# Patient Record
Sex: MALE | Race: WHITE | NOT HISPANIC OR LATINO | Employment: FULL TIME | URBAN - METROPOLITAN AREA
[De-identification: names, ages, dates, MRNs, and addresses within clinical notes are randomized per-mention and may not be internally consistent; named-entity substitution may affect disease eponyms.]

---

## 2020-08-06 ENCOUNTER — APPOINTMENT (OUTPATIENT)
Dept: LAB | Facility: CLINIC | Age: 40
End: 2020-08-06
Payer: COMMERCIAL

## 2020-08-06 ENCOUNTER — TRANSCRIBE ORDERS (OUTPATIENT)
Dept: URGENT CARE | Facility: CLINIC | Age: 40
End: 2020-08-06

## 2020-08-06 DIAGNOSIS — F43.10 PTSD (POST-TRAUMATIC STRESS DISORDER): ICD-10-CM

## 2020-08-06 DIAGNOSIS — F41.9 ANXIETY: Primary | ICD-10-CM

## 2020-08-06 PROCEDURE — 80307 DRUG TEST PRSMV CHEM ANLYZR: CPT | Performed by: FAMILY MEDICINE

## 2020-08-07 LAB
AMPHETAMINES UR QL SCN: NEGATIVE NG/ML
BARBITURATES UR QL SCN: NEGATIVE NG/ML
BENZODIAZ UR QL: NEGATIVE NG/ML
BZE UR QL: NEGATIVE NG/ML
CANNABINOIDS UR QL SCN: NEGATIVE NG/ML
METHADONE UR QL SCN: NEGATIVE NG/ML
OPIATES UR QL: NEGATIVE NG/ML
PCP UR QL: NEGATIVE NG/ML
PROPOXYPH UR QL SCN: NEGATIVE NG/ML

## 2020-10-31 ENCOUNTER — TRANSCRIBE ORDERS (OUTPATIENT)
Dept: LAB | Facility: HOSPITAL | Age: 40
End: 2020-10-31

## 2020-10-31 ENCOUNTER — LAB (OUTPATIENT)
Dept: LAB | Facility: HOSPITAL | Age: 40
End: 2020-10-31
Payer: COMMERCIAL

## 2020-10-31 DIAGNOSIS — D64.9 ANEMIA, UNSPECIFIED TYPE: ICD-10-CM

## 2020-10-31 DIAGNOSIS — D64.9 ANEMIA, UNSPECIFIED TYPE: Primary | ICD-10-CM

## 2020-10-31 LAB
ANION GAP SERPL CALCULATED.3IONS-SCNC: 7 MMOL/L (ref 4–13)
BASOPHILS # BLD AUTO: 0.1 THOUSANDS/ΜL (ref 0–0.1)
BASOPHILS NFR BLD AUTO: 1 % (ref 0–2)
BUN SERPL-MCNC: 16 MG/DL (ref 7–25)
CALCIUM SERPL-MCNC: 9 MG/DL (ref 8.6–10.5)
CHLORIDE SERPL-SCNC: 105 MMOL/L (ref 98–107)
CHOLEST SERPL-MCNC: 158 MG/DL (ref 0–200)
CO2 SERPL-SCNC: 26 MMOL/L (ref 21–31)
CREAT SERPL-MCNC: 0.95 MG/DL (ref 0.7–1.3)
EOSINOPHIL # BLD AUTO: 0.3 THOUSAND/ΜL (ref 0–0.61)
EOSINOPHIL NFR BLD AUTO: 4 % (ref 0–5)
ERYTHROCYTE [DISTWIDTH] IN BLOOD BY AUTOMATED COUNT: 13.6 % (ref 11.5–14.5)
GFR SERPL CREATININE-BSD FRML MDRD: 99 ML/MIN/1.73SQ M
GLUCOSE P FAST SERPL-MCNC: 93 MG/DL (ref 65–99)
HCT VFR BLD AUTO: 46.3 % (ref 42–47)
HDLC SERPL-MCNC: 44 MG/DL
HGB BLD-MCNC: 16.2 G/DL (ref 14–18)
IRON SATN MFR SERPL: 33 %
IRON SERPL-MCNC: 90 UG/DL (ref 65–175)
LDLC SERPL CALC-MCNC: 99 MG/DL (ref 0–100)
LYMPHOCYTES # BLD AUTO: 1.5 THOUSANDS/ΜL (ref 0.6–4.47)
LYMPHOCYTES NFR BLD AUTO: 23 % (ref 21–51)
MCH RBC QN AUTO: 33.6 PG (ref 26–34)
MCHC RBC AUTO-ENTMCNC: 35 G/DL (ref 31–37)
MCV RBC AUTO: 96 FL (ref 81–99)
MONOCYTES # BLD AUTO: 0.6 THOUSAND/ΜL (ref 0.17–1.22)
MONOCYTES NFR BLD AUTO: 9 % (ref 2–12)
NEUTROPHILS # BLD AUTO: 4.1 THOUSANDS/ΜL (ref 1.4–6.5)
NEUTS SEG NFR BLD AUTO: 63 % (ref 42–75)
NONHDLC SERPL-MCNC: 114 MG/DL
PLATELET # BLD AUTO: 136 THOUSANDS/UL (ref 149–390)
PMV BLD AUTO: 10 FL (ref 8.6–11.7)
POTASSIUM SERPL-SCNC: 4.1 MMOL/L (ref 3.5–5.5)
RBC # BLD AUTO: 4.82 MILLION/UL (ref 4.3–5.9)
SODIUM SERPL-SCNC: 138 MMOL/L (ref 134–143)
TIBC SERPL-MCNC: 269 UG/DL (ref 250–450)
TRIGL SERPL-MCNC: 74 MG/DL (ref 44–166)
TSH SERPL DL<=0.05 MIU/L-ACNC: 0.93 UIU/ML (ref 0.45–5.33)
WBC # BLD AUTO: 6.5 THOUSAND/UL (ref 4.8–10.8)

## 2020-10-31 PROCEDURE — 80061 LIPID PANEL: CPT

## 2020-10-31 PROCEDURE — 84443 ASSAY THYROID STIM HORMONE: CPT

## 2020-10-31 PROCEDURE — 85025 COMPLETE CBC W/AUTO DIFF WBC: CPT

## 2020-10-31 PROCEDURE — 36415 COLL VENOUS BLD VENIPUNCTURE: CPT

## 2020-10-31 PROCEDURE — 83540 ASSAY OF IRON: CPT

## 2020-10-31 PROCEDURE — 83550 IRON BINDING TEST: CPT

## 2020-10-31 PROCEDURE — 80048 BASIC METABOLIC PNL TOTAL CA: CPT

## 2020-12-21 ENCOUNTER — HOSPITAL ENCOUNTER (EMERGENCY)
Facility: HOSPITAL | Age: 40
Discharge: HOME | End: 2020-12-21
Attending: EMERGENCY MEDICINE
Payer: COMMERCIAL

## 2020-12-21 ENCOUNTER — APPOINTMENT (EMERGENCY)
Dept: RADIOLOGY | Facility: HOSPITAL | Age: 40
End: 2020-12-21
Payer: COMMERCIAL

## 2020-12-21 VITALS
WEIGHT: 150 LBS | DIASTOLIC BLOOD PRESSURE: 69 MMHG | SYSTOLIC BLOOD PRESSURE: 104 MMHG | OXYGEN SATURATION: 96 % | BODY MASS INDEX: 21.47 KG/M2 | TEMPERATURE: 98.5 F | HEIGHT: 70 IN | RESPIRATION RATE: 20 BRPM | HEART RATE: 72 BPM

## 2020-12-21 DIAGNOSIS — A05.9 FOOD POISONING: Primary | ICD-10-CM

## 2020-12-21 LAB
ALBUMIN SERPL BCP-MCNC: 4.4 G/DL (ref 3.5–5.7)
ALP SERPL-CCNC: 72 U/L (ref 40–150)
ALT SERPL W P-5'-P-CCNC: 46 U/L (ref 7–52)
ANION GAP SERPL CALCULATED.3IONS-SCNC: 7 MMOL/L (ref 4–13)
AST SERPL W P-5'-P-CCNC: 80 U/L (ref 13–39)
BASOPHILS # BLD AUTO: 0 THOUSANDS/ΜL (ref 0–0.1)
BASOPHILS NFR BLD AUTO: 1 % (ref 0–2)
BILIRUB SERPL-MCNC: 1.2 MG/DL (ref 0.2–1)
BILIRUB UR QL STRIP: ABNORMAL
BUN SERPL-MCNC: 9 MG/DL (ref 7–25)
CALCIUM SERPL-MCNC: 9 MG/DL (ref 8.6–10.5)
CHLORIDE SERPL-SCNC: 102 MMOL/L (ref 98–107)
CLARITY UR: CLEAR
CO2 SERPL-SCNC: 27 MMOL/L (ref 21–31)
COLOR UR: ABNORMAL
CREAT SERPL-MCNC: 0.75 MG/DL (ref 0.7–1.3)
EOSINOPHIL # BLD AUTO: 0.1 THOUSAND/ΜL (ref 0–0.61)
EOSINOPHIL NFR BLD AUTO: 1 % (ref 0–5)
ERYTHROCYTE [DISTWIDTH] IN BLOOD BY AUTOMATED COUNT: 13 % (ref 11.5–14.5)
FLUAV RNA RESP QL NAA+PROBE: NEGATIVE
FLUBV RNA RESP QL NAA+PROBE: NEGATIVE
GFR SERPL CREATININE-BSD FRML MDRD: 114 ML/MIN/1.73SQ M
GLUCOSE SERPL-MCNC: 106 MG/DL (ref 65–99)
GLUCOSE UR STRIP-MCNC: NEGATIVE MG/DL
HCT VFR BLD AUTO: 43.4 % (ref 42–47)
HGB BLD-MCNC: 14.8 G/DL (ref 14–18)
HGB UR QL STRIP.AUTO: NEGATIVE
KETONES UR STRIP-MCNC: NEGATIVE MG/DL
LEUKOCYTE ESTERASE UR QL STRIP: NEGATIVE
LIPASE SERPL-CCNC: 21 U/L (ref 11–82)
LYMPHOCYTES # BLD AUTO: 1.4 THOUSANDS/ΜL (ref 0.6–4.47)
LYMPHOCYTES NFR BLD AUTO: 25 % (ref 21–51)
MCH RBC QN AUTO: 32.7 PG (ref 26–34)
MCHC RBC AUTO-ENTMCNC: 34.1 G/DL (ref 31–37)
MCV RBC AUTO: 96 FL (ref 81–99)
MONOCYTES # BLD AUTO: 0.4 THOUSAND/ΜL (ref 0.17–1.22)
MONOCYTES NFR BLD AUTO: 7 % (ref 2–12)
NEUTROPHILS # BLD AUTO: 3.7 THOUSANDS/ΜL (ref 1.4–6.5)
NEUTS SEG NFR BLD AUTO: 66 % (ref 42–75)
NITRITE UR QL STRIP: NEGATIVE
PH UR STRIP.AUTO: 6.5 [PH]
PLATELET # BLD AUTO: 144 THOUSANDS/UL (ref 149–390)
PMV BLD AUTO: 9.3 FL (ref 8.6–11.7)
POTASSIUM SERPL-SCNC: 3.3 MMOL/L (ref 3.5–5.5)
PROT SERPL-MCNC: 6.7 G/DL (ref 6.4–8.9)
PROT UR STRIP-MCNC: NEGATIVE MG/DL
RBC # BLD AUTO: 4.53 MILLION/UL (ref 4.3–5.9)
RSV RNA RESP QL NAA+PROBE: NEGATIVE
SARS-COV-2 RNA RESP QL NAA+PROBE: NEGATIVE
SODIUM SERPL-SCNC: 136 MMOL/L (ref 134–143)
SP GR UR STRIP.AUTO: 1.02 (ref 1–1.03)
TROPONIN I SERPL-MCNC: <0.03 NG/ML
UROBILINOGEN UR QL STRIP.AUTO: 1 E.U./DL
WBC # BLD AUTO: 5.6 THOUSAND/UL (ref 4.8–10.8)

## 2020-12-21 PROCEDURE — 83690 ASSAY OF LIPASE: CPT | Performed by: EMERGENCY MEDICINE

## 2020-12-21 PROCEDURE — 36415 COLL VENOUS BLD VENIPUNCTURE: CPT | Performed by: EMERGENCY MEDICINE

## 2020-12-21 PROCEDURE — 93005 ELECTROCARDIOGRAM TRACING: CPT

## 2020-12-21 PROCEDURE — 0241U HB NFCT DS VIR RESP RNA 4 TRGT: CPT | Performed by: EMERGENCY MEDICINE

## 2020-12-21 PROCEDURE — 81003 URINALYSIS AUTO W/O SCOPE: CPT | Performed by: EMERGENCY MEDICINE

## 2020-12-21 PROCEDURE — 85025 COMPLETE CBC W/AUTO DIFF WBC: CPT | Performed by: EMERGENCY MEDICINE

## 2020-12-21 PROCEDURE — 80053 COMPREHEN METABOLIC PANEL: CPT | Performed by: EMERGENCY MEDICINE

## 2020-12-21 PROCEDURE — 84484 ASSAY OF TROPONIN QUANT: CPT | Performed by: EMERGENCY MEDICINE

## 2020-12-21 PROCEDURE — 71045 X-RAY EXAM CHEST 1 VIEW: CPT

## 2020-12-21 PROCEDURE — 99285 EMERGENCY DEPT VISIT HI MDM: CPT

## 2020-12-21 PROCEDURE — 99285 EMERGENCY DEPT VISIT HI MDM: CPT | Performed by: EMERGENCY MEDICINE

## 2020-12-21 RX ORDER — ASPIRIN 81 MG/1
162 TABLET, CHEWABLE ORAL ONCE
Status: COMPLETED | OUTPATIENT
Start: 2020-12-21 | End: 2020-12-21

## 2020-12-21 RX ORDER — ONDANSETRON 4 MG/1
4 TABLET, ORALLY DISINTEGRATING ORAL EVERY 6 HOURS PRN
Qty: 10 TABLET | Refills: 0 | Status: ON HOLD | OUTPATIENT
Start: 2020-12-21 | End: 2021-08-04 | Stop reason: ALTCHOICE

## 2020-12-21 RX ORDER — MIRTAZAPINE 15 MG/1
7.5 TABLET, FILM COATED ORAL
Status: ON HOLD | COMMUNITY
End: 2021-09-28 | Stop reason: SDUPTHER

## 2020-12-21 RX ADMIN — ASPIRIN 81 MG CHEWABLE TABLET 162 MG: 81 TABLET CHEWABLE at 20:31

## 2020-12-22 LAB
ATRIAL RATE: 84 BPM
P AXIS: 34 DEGREES
PR INTERVAL: 140 MS
QRS AXIS: 76 DEGREES
QRSD INTERVAL: 82 MS
QT INTERVAL: 354 MS
QTC INTERVAL: 418 MS
T WAVE AXIS: 70 DEGREES
VENTRICULAR RATE: 84 BPM

## 2020-12-22 PROCEDURE — 93010 ELECTROCARDIOGRAM REPORT: CPT | Performed by: INTERNAL MEDICINE

## 2020-12-23 ENCOUNTER — NURSE TRIAGE (OUTPATIENT)
Dept: OTHER | Facility: OTHER | Age: 40
End: 2020-12-23

## 2020-12-23 DIAGNOSIS — Z20.822 EXPOSURE TO COVID-19 VIRUS: Primary | ICD-10-CM

## 2020-12-23 DIAGNOSIS — Z20.822 EXPOSURE TO COVID-19 VIRUS: ICD-10-CM

## 2020-12-23 PROCEDURE — U0003 INFECTIOUS AGENT DETECTION BY NUCLEIC ACID (DNA OR RNA); SEVERE ACUTE RESPIRATORY SYNDROME CORONAVIRUS 2 (SARS-COV-2) (CORONAVIRUS DISEASE [COVID-19]), AMPLIFIED PROBE TECHNIQUE, MAKING USE OF HIGH THROUGHPUT TECHNOLOGIES AS DESCRIBED BY CMS-2020-01-R: HCPCS | Performed by: FAMILY MEDICINE

## 2020-12-24 LAB — SARS-COV-2 RNA SPEC QL NAA+PROBE: NOT DETECTED

## 2021-01-14 ENCOUNTER — OFFICE VISIT (OUTPATIENT)
Dept: URGENT CARE | Facility: CLINIC | Age: 41
End: 2021-01-14
Payer: COMMERCIAL

## 2021-01-14 ENCOUNTER — NURSE TRIAGE (OUTPATIENT)
Dept: OTHER | Facility: OTHER | Age: 41
End: 2021-01-14

## 2021-01-14 VITALS
HEIGHT: 70 IN | TEMPERATURE: 98 F | WEIGHT: 150 LBS | HEART RATE: 85 BPM | OXYGEN SATURATION: 97 % | BODY MASS INDEX: 21.47 KG/M2 | RESPIRATION RATE: 18 BRPM

## 2021-01-14 DIAGNOSIS — R05.9 COUGH: Primary | ICD-10-CM

## 2021-01-14 DIAGNOSIS — B34.9 VIRAL ILLNESS: ICD-10-CM

## 2021-01-14 PROCEDURE — G0382 LEV 3 HOSP TYPE B ED VISIT: HCPCS | Performed by: PHYSICIAN ASSISTANT

## 2021-01-14 PROCEDURE — U0005 INFEC AGEN DETEC AMPLI PROBE: HCPCS | Performed by: PHYSICIAN ASSISTANT

## 2021-01-14 PROCEDURE — 99203 OFFICE O/P NEW LOW 30 MIN: CPT | Performed by: PHYSICIAN ASSISTANT

## 2021-01-14 PROCEDURE — U0003 INFECTIOUS AGENT DETECTION BY NUCLEIC ACID (DNA OR RNA); SEVERE ACUTE RESPIRATORY SYNDROME CORONAVIRUS 2 (SARS-COV-2) (CORONAVIRUS DISEASE [COVID-19]), AMPLIFIED PROBE TECHNIQUE, MAKING USE OF HIGH THROUGHPUT TECHNOLOGIES AS DESCRIBED BY CMS-2020-01-R: HCPCS | Performed by: PHYSICIAN ASSISTANT

## 2021-01-14 PROCEDURE — 99283 EMERGENCY DEPT VISIT LOW MDM: CPT | Performed by: PHYSICIAN ASSISTANT

## 2021-01-14 RX ORDER — ZIPRASIDONE HYDROCHLORIDE 40 MG/1
CAPSULE ORAL
Status: ON HOLD | COMMUNITY
End: 2021-05-14 | Stop reason: ALTCHOICE

## 2021-01-14 NOTE — PATIENT INSTRUCTIONS
You can take 2000 IU of vitamin D3 daily, vitamin-C 1 g every 12 hours, and a daily multivitamin  Please self quarantine until results of testing are known and if positive please follow CDC guidelines for quarantining as discussed  COVID-19 (Coronavirus Disease 2019)   WHAT YOU NEED TO KNOW:   COVID-19 is the disease caused by the novel (new) coronavirus first discovered in December 2019  Coronaviruses generally cause upper respiratory (nose, throat, and lung) infections, such as a cold  The new virus can also cause serious lower respiratory conditions, such as pneumonia or acute respiratory distress syndrome (ARDS)  Anyone can develop serious problems from the new virus, but your risk is higher if you are 65 or older  A weak immune system, diabetes, or a heart or lung condition can also increase your risk  DISCHARGE INSTRUCTIONS:   If you think you or someone you know may be infected:  Do the following to protect others:  · If emergency care is needed,  tell the  about the possible infection, or call ahead and tell the emergency department  · Call a healthcare provider  for instructions if symptoms are mild  Anyone who may be infected should not  arrive without calling first  The provider will need to protect staff members and other patients  · The person who may be infected needs to wear a face covering  while getting medical care  This will help lower the risk of infecting others  Coverings are not used for anyone who is younger than 2 years, has breathing problems, or cannot remove it  The provider can give you instructions for anyone who cannot wear a covering  Call your local emergency number (911 in the [address] [place] [address],[address]) or go to the emergency department if:   · You have trouble breathing or shortness of breath at rest     · You have chest pain or pressure that lasts longer than 5 minutes  · You become confused or hard to wake  · Your lips or face are blue      · You have a fever of 104°F (40°C) or higher  Call your doctor if:   · You do not  have symptoms of COVID-19 but had close physical contact within 14 days with someone who tested positive  · You have questions or concerns about your condition or care  Medicines: You may need any of the following for mild symptoms:  · Decongestants  help reduce nasal congestion and help you breathe more easily  If you take decongestant pills, they may make you feel restless or cause problems with your sleep  Do not use decongestant sprays for more than a few days  · Cough suppressants  help reduce coughing  Ask your healthcare provider which type of cough medicine is best for you  · Acetaminophen  decreases pain and fever  It is available without a doctor's order  Ask how much to take and how often to take it  Follow directions  Read the labels of all other medicines you are using to see if they also contain acetaminophen, or ask your doctor or pharmacist  Acetaminophen can cause liver damage if not taken correctly  Do not use more than 4 grams (4,000 milligrams) total of acetaminophen in one day  · NSAIDs , such as ibuprofen, help decrease swelling, pain, and fever  NSAIDs can cause stomach bleeding or kidney problems in certain people  If you take blood thinner medicine, always ask your healthcare provider if NSAIDs are safe for you  Always read the medicine label and follow directions  · Take your medicine as directed  Contact your healthcare provider if you think your medicine is not helping or if you have side effects  Tell him or her if you are allergic to any medicine  Keep a list of the medicines, vitamins, and herbs you take  Include the amounts, and when and why you take them  Bring the list or the pill bottles to follow-up visits  Carry your medicine list with you in case of an emergency  How the 2019 coronavirus spreads: The virus spreads quickly and easily   You can become infected if you are in contact with a large amount of the virus, even for a short time  You can also become infected by being around a small amount of virus for a long time  The following are ways the virus is thought to spread, but more information may be coming:  · Droplets are the most common way all coronaviruses spread  The virus can travel in droplets that form when a person talks, coughs, or sneezes  Anyone who breathes in the droplets or gets them in his or her eyes can become infected with the virus  Close personal contact with an infected person is thought to be the main way the virus spreads  Close personal contact means you are within 6 feet (2 meters) of the person  · Person-to-person contact can spread the virus  For example, a person with the virus on his or her hands can spread it by shaking hands with someone  At this time, it does not appear that the virus can be passed to a baby during pregnancy or delivery  The baby can be infected after he or she is born through person-to-person contact  The virus also does not appear to spread in breast milk  If you are pregnant or breastfeeding, talk to your healthcare provider or obstetrician about any concerns you have  · The virus can stay on objects and surfaces  A person can get the virus on his or her hands by touching the object or surface  Infection happens if the person then touches his or her eyes or mouth with unwashed hands  It is not yet known how long the virus can stay on an object or surface  That is why it is important to clean all surfaces that are used regularly  · An infected animal may be able to infect a person who touches it  This may happen at live markets or on a farm  How everyone can lower the risk for COVID-19:  The best way to prevent infection is to avoid anyone who is infected, but this can be hard to do  An infected person can spread the virus before signs or symptoms begin, or even if signs or symptoms never develop   The following can help lower the risk for infection: · Wash your hands often throughout the day  Use soap and water  Rub your soapy hands together, lacing your fingers  Wash the front and back of each hand, and in between your fingers  Use the fingers of one hand to scrub under the fingernails of the other hand  Wash for at least 20 seconds  Rinse with warm, running water for several seconds  Then dry your hands with a clean towel or paper towel  Use hand  that contains alcohol if soap and water are not available  Do not touch your eyes, nose, or mouth without washing your hands first  Teach children how to wash their hands and use hand   · Cover a sneeze or cough  This prevents droplets from traveling from you to others  Turn your face away and cover your mouth and nose with a tissue  Throw the tissue away  Use the bend of your arm if a tissue is not available  Then wash your hands well with soap and water or use hand   Turn and cover your face if you are around someone who is sneezing or coughing  Teach children how to cover a cough or sneeze  · Follow worldwide, national, and local social distancing guidelines  Social distancing means people avoid close physical contact so the virus cannot spread from one person to another  Keep at least 6 feet (2 meters) between you and others  Also keep this distance from anyone who comes to your home, such as someone making a delivery  · Make a habit of not touching your face  It is not known how long the virus can stay on objects and surfaces  If you get the virus on your hands, you can transfer it to your eyes, nose, or mouth and become infected  You can also transfer it to objects, surfaces, or people  Be aware of what you touch when you go out  Examples include handrails and elevator buttons  Try not to touch anything with bare hands unless it is necessary  Wash your hands before you leave your home and when you return      · Clean and disinfect high-touch surfaces and objects often   Use a disinfecting solution or wipes  You can make a solution by diluting 4 teaspoons of bleach in 1 quart (4 cups) of water  Clean and disinfect even if you think no one living in or coming to your home is infected with the virus  You can wipe items with a disinfecting cloth before you bring them into your home  Wash your hands after you handle anything you bring into your home  · Make your immune system as healthy as possible  A weakened immune system makes you more vulnerable to the new coronavirus  No COVID-19 vaccine is available yet  Vaccines such as the flu and pneumonia vaccines can help your immune system  Your healthcare provider can tell you which vaccines to get, and when to get them  Keep your immune system as strong as possible  Do not smoke  Eat healthy foods, exercise regularly, and try to manage stress  Go to bed and wake up at the same times each day  Social distancing guidelines:  National and local social distancing rules vary  Rules may change over time as restrictions are lifted  Restrictions may return if an outbreak happens where you live  It is important to know and follow all current social distancing rules in your area  The following are general guidelines:  · Limit trips out of your home  You may be able to have food, medicines, and other supplies delivered  If possible, have delivered items left at your door or other area  Try not to have someone hand you an item  You will be so close to the person that the virus can spread between you  · Do not have close physical contact with anyone who does not live in your home  Do not shake hands with, hug, or kiss a person as a greeting  Stand or walk as far from others as possible  If you must use public transportation (such as a bus or subway), try to sit or stand away from others  You can stay safely connected with others through phone calls, e-mail messages, social media websites, and video chats   Check in on anyone who may be having a hard time socially distancing, or who lives alone  Ask administrators at nursing homes or long-term care facilities how you can safely communicate with someone living there  · Wear a cloth face covering around others who do not live in your home  Face coverings help prevent the virus from spreading to others in droplets  You can use a clear face covering if someone needs to read your lips  This is a cloth covering that has plastic over the mouth area so your lips can be seen  Do not use coverings that have breathing valves or vents  The virus can travel out of the valve or vent and be spread to others  Do not take your covering off to talk, cough, or sneeze  Do not use coverings on children younger than 2 years or on anyone who has breathing problems or cannot remove it  · Only allow medical or other necessary professionals into your home  Wear your face covering, and remind professionals to wear a face covering  Remind them to wash their hands when they arrive and before they leave  Do not  let anyone who does not live in your home in, even if the person is not sick  A person can pass the virus to others before symptoms of COVID-19 begin  Some people never even develop symptoms  Children commonly have mild symptoms or no symptoms  It may be hard to tell a child not to hug or kiss you  Explain that this is how he or she can help you stay healthy  · Do not go to someone else's home unless it is necessary  Do not go over to visit, even if the person is lonely  Only go if you need to help him or her  Make sure you both wear face coverings while you are there  · Avoid large gatherings and crowds  Gatherings or crowds of 10 or more individuals can cause the virus to spread  Examples of gatherings include parties, sporting events, [employer] services, and conferences  Crowds may form at beaches, [place], and tourist attractions   Protect yourself by staying away from large gatherings and crowds  · Ask your healthcare provider for other ways to have appointments  You may be able to have appointments without having to go into the provider's office  Some providers offer phone, video, or other types of appointments  You may also be able to get prescriptions for a few months of your medicines at a time  · Stay safe if you must go out to work  You may have a job that can only be done outside your home  Keep physical distance between you and other workers as much as possible  Follow your employer's rules so everyone stays safe  If you have COVID-19 and are recovering at home:  Healthcare providers will give you specific instructions to follow  The following are general guidelines to remind you how to keep others safe until you are well:  · Wash your hands often  Use soap and water as much as possible  You can use hand  that contains alcohol if soap and water are not available  Do not share towels with anyone  If you use paper towels, throw them away in a lined trash can kept in your room or area  Use a covered trash can, if possible  · Do not go out of your home unless it is necessary  You may have to go to your healthcare provider's office for check-ups or to get prescription refills  Do not arrive at the provider's office without an appointment  Providers have to make their offices safe for staff and other patients  · Do not have close physical contact with anyone unless it is necessary  Only have close physical contact with a person giving direct care, or a baby or child you must care for  Family members and friends should not visit you  If possible, stay in a separate area or room of your home if you live with others  No one should go into the area or room except to give you care  You can visit with others by phone, video chat, e-mail, or similar systems  It is important to stay connected with others in your life while you recover      · Wear a face covering while others are near you  This can help prevent droplets from spreading the virus when you talk, sneeze, or cough  Put the covering on before anyone comes into your room or area  Remind the person to cover his or her nose and mouth before going in to provide care for you  · Do not share items  Do not share dishes, towels, or other items with anyone  Items need to be washed after you use them  · Protect your baby  Wash your hands with soap and water often throughout the day  Wear a clean face covering while you breastfeed, or while you express or pump breast milk  If possible, ask someone who is well to care for your baby  You can put breast milk in bottles for the person to use, if needed  Talk to your healthcare provider if you have any questions or concerns about caring for or bonding with your baby  He or she will tell you when to bring your baby in for check-ups and vaccines  He or she will also tell you what to do if you think your baby was infected with the new virus  · Do not handle live animals  Until more is known, it is best not to touch, play with, or handle live animals  Some animals, including pets, have been infected with the new coronavirus  Do not handle or care for animals until you are well  Care includes feeding, petting, and cuddling your pet  Do not let your pet lick you or share your food  Ask someone who is not infected to take care of your pet, if possible  If you must care for a pet, wear a face covering  Wash your hands before and after you give care  · Follow directions from your healthcare provider for being around others after you recover  You will need to wait at least 10 days after symptoms first appeared  Then you will need to have no fever for 24 hours without fever medicine, and no other symptoms  A loss of taste or smell may continue for several months  It is considered okay to be around others if this is your only symptom   It is not known for sure if or for how long a recovered person can pass the virus to others  Your provider may give you instructions, such as continuing social distancing or wearing a face covering around others  How to take care of someone who has COVID-19:  If the person lives in another home, arrange for a time to give care  Remember to bring a few pairs of disposable gloves and a cloth face covering  The following are general guidelines to help you safely care for anyone who has COVID-19:  · Wash your hands often  Wash before and after you go into the person's home, area, or room  Throw paper towels away in a lined trash can that has a lid, if possible  · Do not allow others to go near the person  No one should come into the person's home unless it is necessary  If possible, the person should be in a separate area or room if he or she lives with others  Keep the room's door shut unless you need to go in or out  Have others call, video chat, or e-mail the person if he or she is feeling well enough  The person may feel lonely if he or she is kept separate for a long period of time  Safe communication can help him or her stay connected to family and friends  · Make sure the person's room has good air flow  You may be able to open the window if the weather allows  An air conditioner can also be turned on to help air move  · Contact the person before you go in to give care  Make sure the person is wearing a face covering  Remind him or her to wash his or her hands with soap and water  He or she can use hand  that contains alcohol if soap and water are not available  Put on a face covering before you go in to give care  · Wear gloves while you give care and clean  Clean items the person uses often  Clean countertops, cooking surfaces, and the fronts and insides of the microwave and refrigerator  Clean the shower, toilet, the area around the toilet, the sink, the area around the sink, and faucets  Gather used laundry or bedding   Wash and dry items on the warmest settings the fabric allows  Wash dishes and silverware in hot, soapy water or in a   · Anything you throw away needs to go into a lined trash can  When you need to empty the trash, close the open end of the lining and tie it closed  This helps prevent items the virus is on from spilling out of the trash  Remove your gloves and throw them away  Wash your hands  Follow up with your doctor as directed:  Write down your questions so you remember to ask them during your visits  For more information:   · Centers for Disease Control and Prevention  [address] , [address]  Phone: 8- 939 - 297-9474  Web Address: [employer] com br    © Copyright [address] Information is for End User's use only and may not be sold, redistributed or otherwise used for commercial purposes  All illustrations and images included in CareNotes® are the copyrighted property of A D A M , Inc  or [facility] Lara Trejo   The above information is an  only  It is not intended as medical advice for individual conditions or treatments  Talk to your doctor, nurse or pharmacist before following any medical regimen to see if it is safe and effective for you

## 2021-01-14 NOTE — TELEPHONE ENCOUNTER
Patient advised that he should go to Urgent care now for evaluation for SOB at rest and Pain in chest on movement  Patient verbalized understanding and will follow plan of care  Reason for Disposition   [1] COVID-19 infection suspected by caller or triager AND [2] mild symptoms (cough, fever, or others) AND [6] no complications or SOB    Answer Assessment - Initial Assessment Questions  1  COVID-19 DIAGNOSIS: "Who made your Coronavirus (COVID-19) diagnosis?" "Was it confirmed by a positive lab test?" If not diagnosed by a HCP, ask "Are there lots of cases (community spread) where you live?" (See public health department website, if unsure)     Friend tested positive    2  COVID-19 EXPOSURE: "Was there any known exposure to COVID before the symptoms began?" CDC Definition of close contact: within 6 feet (2 meters) for a total of 15 minutes or more over a 24-hour period  Same room   3  ONSET: "When did the COVID-19 symptoms start?"       1/11  4  WORST SYMPTOM: "What is your worst symptom?" (e g , cough, fever, shortness of breath, muscle aches)    coughing  5  COUGH: "Do you have a cough?" If so, ask: "How bad is the cough?"        Wet cough  6  FEVER: "Do you have a fever?" If so, ask: "What is your temperature, how was it measured, and when did it start?"      No  7  RESPIRATORY STATUS: "Describe your breathing?" (e g , shortness of breath, wheezing, unable to speak)      SOB at rest  8  BETTER-SAME-WORSE: "Are you getting better, staying the same or getting worse compared to yesterday?"  If getting worse, ask, "In what way?"      worse  9  HIGH RISK DISEASE: "Do you have any chronic medical problems?" (e g , asthma, heart or lung disease, weak immune system, etc )      No  10  PREGNANCY: "Is there any chance you are pregnant?" "When was your last menstrual period?"        N/A  11   OTHER SYMPTOMS: "Do you have any other symptoms?"  (e g , chills, fatigue, headache, loss of smell or taste, muscle pain, sore throat)    Chills, Nausea, muscle pain, Pain in chest on movement    Protocols used: CORONAVIRUS (COVID-19)  DIAGNOSED OR SUSPECTED-ADULT-OH

## 2021-01-14 NOTE — PROGRESS NOTES
[address]        NAME: Bjorn Toth is a 38 y o  male  : 1980    MRN: [id number]  DATE: 2021  TIME: 12:57 PM    Assessment and Plan   Cough [R05]  1  Cough  Novel Coronavirus (COVID-19), PCR [facility] - Office Collection   2  Viral illness           Patient Instructions     Patient Instructions     You can take 2000 IU of vitamin D3 daily, vitamin-C 1 g every 12 hours, and a daily multivitamin  Please self quarantine until results of testing are known and if positive please follow CDC guidelines for quarantining as discussed  COVID-19 (Coronavirus Disease 2019)   WHAT YOU NEED TO KNOW:   COVID-19 is the disease caused by the novel (new) coronavirus first discovered in 2019  Coronaviruses generally cause upper respiratory (nose, throat, and lung) infections, such as a cold  The new virus can also cause serious lower respiratory conditions, such as pneumonia or acute respiratory distress syndrome (ARDS)  Anyone can develop serious problems from the new virus, but your risk is higher if you are 65 or older  A weak immune system, diabetes, or a heart or lung condition can also increase your risk  DISCHARGE INSTRUCTIONS:   If you think you or someone you know may be infected:  Do the following to protect others:  · If emergency care is needed,  tell the  about the possible infection, or call ahead and tell the emergency department  · Call a healthcare provider  for instructions if symptoms are mild  Anyone who may be infected should not  arrive without calling first  The provider will need to protect staff members and other patients  · The person who may be infected needs to wear a face covering  while getting medical care  This will help lower the risk of infecting others  Coverings are not used for anyone who is younger than 2 years, has breathing problems, or cannot remove it  The provider can give you instructions for anyone who cannot wear a covering      Call your local emergency number (911 in the [address]) or go to the emergency department if:   · You have trouble breathing or shortness of breath at rest     · You have chest pain or pressure that lasts longer than 5 minutes  · You become confused or hard to wake  · Your lips or face are blue  · You have a fever of 104°F (40°C) or higher  Call your doctor if:   · You do not  have symptoms of COVID-19 but had close physical contact within 14 days with someone who tested positive  · You have questions or concerns about your condition or care  Medicines: You may need any of the following for mild symptoms:  · Decongestants  help reduce nasal congestion and help you breathe more easily  If you take decongestant pills, they may make you feel restless or cause problems with your sleep  Do not use decongestant sprays for more than a few days  · Cough suppressants  help reduce coughing  Ask your healthcare provider which type of cough medicine is best for you  · Acetaminophen  decreases pain and fever  It is available without a doctor's order  Ask how much to take and how often to take it  Follow directions  Read the labels of all other medicines you are using to see if they also contain acetaminophen, or ask your doctor or pharmacist  Acetaminophen can cause liver damage if not taken correctly  Do not use more than 4 grams (4,000 milligrams) total of acetaminophen in one day  · NSAIDs , such as ibuprofen, help decrease swelling, pain, and fever  NSAIDs can cause stomach bleeding or kidney problems in certain people  If you take blood thinner medicine, always ask your healthcare provider if NSAIDs are safe for you  Always read the medicine label and follow directions  · Take your medicine as directed  Contact your healthcare provider if you think your medicine is not helping or if you have side effects  Tell him or her if you are allergic to any medicine   Keep a list of the medicines, vitamins, and herbs you take  Include the amounts, and when and why you take them  Bring the list or the pill bottles to follow-up visits  Carry your medicine list with you in case of an emergency  How the 2019 coronavirus spreads: The virus spreads quickly and easily  You can become infected if you are in contact with a large amount of the virus, even for a short time  You can also become infected by being around a small amount of virus for a long time  The following are ways the virus is thought to spread, but more information may be coming:  · Droplets are the most common way all coronaviruses spread  The virus can travel in droplets that form when a person talks, coughs, or sneezes  Anyone who breathes in the droplets or gets them in his or her eyes can become infected with the virus  Close personal contact with an infected person is thought to be the main way the virus spreads  Close personal contact means you are within 6 feet (2 meters) of the person  · Person-to-person contact can spread the virus  For example, a person with the virus on his or her hands can spread it by shaking hands with someone  At this time, it does not appear that the virus can be passed to a baby during pregnancy or delivery  The baby can be infected after he or she is born through person-to-person contact  The virus also does not appear to spread in breast milk  If you are pregnant or breastfeeding, talk to your healthcare provider or obstetrician about any concerns you have  · The virus can stay on objects and surfaces  A person can get the virus on his or her hands by touching the object or surface  Infection happens if the person then touches his or her eyes or mouth with unwashed hands  It is not yet known how long the virus can stay on an object or surface  That is why it is important to clean all surfaces that are used regularly  · An infected animal may be able to infect a person who touches it    This may happen at live markets or on a farm  How everyone can lower the risk for COVID-19:  The best way to prevent infection is to avoid anyone who is infected, but this can be hard to do  An infected person can spread the virus before signs or symptoms begin, or even if signs or symptoms never develop  The following can help lower the risk for infection:      · Wash your hands often throughout the day  Use soap and water  Rub your soapy hands together, lacing your fingers  Wash the front and back of each hand, and in between your fingers  Use the fingers of one hand to scrub under the fingernails of the other hand  Wash for at least 20 seconds  Rinse with warm, running water for several seconds  Then dry your hands with a clean towel or paper towel  Use hand  that contains alcohol if soap and water are not available  Do not touch your eyes, nose, or mouth without washing your hands first  Teach children how to wash their hands and use hand   · Cover a sneeze or cough  This prevents droplets from traveling from you to others  Turn your face away and cover your mouth and nose with a tissue  Throw the tissue away  Use the bend of your arm if a tissue is not available  Then wash your hands well with soap and water or use hand   Turn and cover your face if you are around someone who is sneezing or coughing  Teach children how to cover a cough or sneeze  · Follow worldwide, national, and local social distancing guidelines  Social distancing means people avoid close physical contact so the virus cannot spread from one person to another  Keep at least 6 feet (2 meters) between you and others  Also keep this distance from anyone who comes to your home, such as someone making a delivery  · Make a habit of not touching your face  It is not known how long the virus can stay on objects and surfaces   If you get the virus on your hands, you can transfer it to your eyes, nose, or mouth and become infected  You can also transfer it to objects, surfaces, or people  Be aware of what you touch when you go out  Examples include handrails and elevator buttons  Try not to touch anything with bare hands unless it is necessary  Wash your hands before you leave your home and when you return  · Clean and disinfect high-touch surfaces and objects often  Use a disinfecting solution or wipes  You can make a solution by diluting 4 teaspoons of bleach in 1 quart (4 cups) of water  Clean and disinfect even if you think no one living in or coming to your home is infected with the virus  You can wipe items with a disinfecting cloth before you bring them into your home  Wash your hands after you handle anything you bring into your home  · Make your immune system as healthy as possible  A weakened immune system makes you more vulnerable to the new coronavirus  No COVID-19 vaccine is available yet  Vaccines such as the flu and pneumonia vaccines can help your immune system  Your healthcare provider can tell you which vaccines to get, and when to get them  Keep your immune system as strong as possible  Do not smoke  Eat healthy foods, exercise regularly, and try to manage stress  Go to bed and wake up at the same times each day  Social distancing guidelines:  National and local social distancing rules vary  Rules may change over time as restrictions are lifted  Restrictions may return if an outbreak happens where you live  It is important to know and follow all current social distancing rules in your area  The following are general guidelines:  · Limit trips out of your home  You may be able to have food, medicines, and other supplies delivered  If possible, have delivered items left at your door or other area  Try not to have someone hand you an item  You will be so close to the person that the virus can spread between you  · Do not have close physical contact with anyone who does not live in your home    Do not shake hands with, hug, or kiss a person as a greeting  Stand or walk as far from others as possible  If you must use public transportation (such as a bus or subway), try to sit or stand away from others  You can stay safely connected with others through phone calls, e-mail messages, social media websites, and video chats  Check in on anyone who may be having a hard time socially distancing, or who lives alone  Ask administrators at nursing homes or long-term care facilities how you can safely communicate with someone living there  · Wear a cloth face covering around others who do not live in your home  Face coverings help prevent the virus from spreading to others in droplets  You can use a clear face covering if someone needs to read your lips  This is a cloth covering that has plastic over the mouth area so your lips can be seen  Do not use coverings that have breathing valves or vents  The virus can travel out of the valve or vent and be spread to others  Do not take your covering off to talk, cough, or sneeze  Do not use coverings on children younger than 2 years or on anyone who has breathing problems or cannot remove it  · Only allow medical or other necessary professionals into your home  Wear your face covering, and remind professionals to wear a face covering  Remind them to wash their hands when they arrive and before they leave  Do not  let anyone who does not live in your home in, even if the person is not sick  A person can pass the virus to others before symptoms of COVID-19 begin  Some people never even develop symptoms  Children commonly have mild symptoms or no symptoms  It may be hard to tell a child not to hug or kiss you  Explain that this is how he or she can help you stay healthy  · Do not go to someone else's home unless it is necessary  Do not go over to visit, even if the person is lonely  Only go if you need to help him or her   Make sure you both wear face coverings while you are there  · Avoid large gatherings and crowds  Gatherings or crowds of 10 or more individuals can cause the virus to spread  Examples of gatherings include parties, sporting events, Buddhist services, and conferences  Crowds may form at beaches, [place], and tourist attractions  Protect yourself by staying away from large gatherings and crowds  · Ask your healthcare provider for other ways to have appointments  You may be able to have appointments without having to go into the provider's office  Some providers offer phone, video, or other types of appointments  You may also be able to get prescriptions for a few months of your medicines at a time  · Stay safe if you must go out to work  You may have a job that can only be done outside your home  Keep physical distance between you and other workers as much as possible  Follow your employer's rules so everyone stays safe  If you have COVID-19 and are recovering at home:  Healthcare providers will give you specific instructions to follow  The following are general guidelines to remind you how to keep others safe until you are well:  · Wash your hands often  Use soap and water as much as possible  You can use hand  that contains alcohol if soap and water are not available  Do not share towels with anyone  If you use paper towels, throw them away in a lined trash can kept in your room or area  Use a covered trash can, if possible  · Do not go out of your home unless it is necessary  You may have to go to your healthcare provider's office for check-ups or to get prescription refills  Do not arrive at the provider's office without an appointment  Providers have to make their offices safe for staff and other patients  · Do not have close physical contact with anyone unless it is necessary  Only have close physical contact with a person giving direct care, or a baby or child you must care for  Family members and friends should not visit you  If possible, stay in a separate area or room of your home if you live with others  No one should go into the area or room except to give you care  You can visit with others by phone, video chat, e-mail, or similar systems  It is important to stay connected with others in your life while you recover  · Wear a face covering while others are near you  This can help prevent droplets from spreading the virus when you talk, sneeze, or cough  Put the covering on before anyone comes into your room or area  Remind the person to cover his or her nose and mouth before going in to provide care for you  · Do not share items  Do not share dishes, towels, or other items with anyone  Items need to be washed after you use them  · Protect your baby  Wash your hands with soap and water often throughout the day  Wear a clean face covering while you breastfeed, or while you express or pump breast milk  If possible, ask someone who is well to care for your baby  You can put breast milk in bottles for the person to use, if needed  Talk to your healthcare provider if you have any questions or concerns about caring for or bonding with your baby  He or she will tell you when to bring your baby in for check-ups and vaccines  He or she will also tell you what to do if you think your baby was infected with the new virus  · Do not handle live animals  Until more is known, it is best not to touch, play with, or handle live animals  Some animals, including pets, have been infected with the new coronavirus  Do not handle or care for animals until you are well  Care includes feeding, petting, and cuddling your pet  Do not let your pet lick you or share your food  Ask someone who is not infected to take care of your pet, if possible  If you must care for a pet, wear a face covering  Wash your hands before and after you give care  · Follow directions from your healthcare provider for being around others after you recover    You will need to wait at least 10 days after symptoms first appeared  Then you will need to have no fever for 24 hours without fever medicine, and no other symptoms  A loss of taste or smell may continue for several months  It is considered okay to be around others if this is your only symptom  It is not known for sure if or for how long a recovered person can pass the virus to others  Your provider may give you instructions, such as continuing social distancing or wearing a face covering around others  How to take care of someone who has COVID-19:  If the person lives in another home, arrange for a time to give care  Remember to bring a few pairs of disposable gloves and a cloth face covering  The following are general guidelines to help you safely care for anyone who has COVID-19:  · Wash your hands often  Wash before and after you go into the person's home, area, or room  Throw paper towels away in a lined trash can that has a lid, if possible  · Do not allow others to go near the person  No one should come into the person's home unless it is necessary  If possible, the person should be in a separate area or room if he or she lives with others  Keep the room's door shut unless you need to go in or out  Have others call, video chat, or e-mail the person if he or she is feeling well enough  The person may feel lonely if he or she is kept separate for a long period of time  Safe communication can help him or her stay connected to family and friends  · Make sure the person's room has good air flow  You may be able to open the window if the weather allows  An air conditioner can also be turned on to help air move  · Contact the person before you go in to give care  Make sure the person is wearing a face covering  Remind him or her to wash his or her hands with soap and water  He or she can use hand  that contains alcohol if soap and water are not available   Put on a face covering before you go in to give care  · Wear gloves while you give care and clean  Clean items the person uses often  Clean countertops, cooking surfaces, and the fronts and insides of the microwave and refrigerator  Clean the shower, toilet, the area around the toilet, the sink, the area around the sink, and faucets  Gather used laundry or bedding  Wash and dry items on the warmest settings the fabric allows  Wash dishes and silverware in hot, soapy water or in a   · Anything you throw away needs to go into a lined trash can  When you need to empty the trash, close the open end of the lining and tie it closed  This helps prevent items the virus is on from spilling out of the trash  Remove your gloves and throw them away  Wash your hands  Follow up with your doctor as directed:  Write down your questions so you remember to ask them during your visits  For more information:   · Centers for Disease Control and Prevention  [address] , [address]  Phone: 0- 158 - 598-9179  Web Address: [employer] com br    © Copyright [address] [place] [address] Information is for End User's use only and may not be sold, redistributed or otherwise used for commercial purposes  All illustrations and images included in CareNotes® are the copyrighted property of A D A M , Inc  or [address] [place], [place] [address]pa[facility]  The above information is an  only  It is not intended as medical advice for individual conditions or treatments  Talk to your doctor, nurse or pharmacist before following any medical regimen to see if it is safe and effective for you  Follow up with PCP in 3-5 days  Proceed to  ER if symptoms worsen  Chief Complaint     Chief Complaint   Patient presents with    Cough     x days    COVID-19     + exposure    Nausea         History of Present Illness       Patient is a 69-year-old male presenting with chest pain, cough, and nausea the past 3-4 days  Patient was exposed to [employer]    Chest pain is sharp, superficial, over the right pectoral muscle, and worsened with movement/palpation/coughing/taking a deep breath  Patient states that feels like he pulled a muscle  Denies significant shortness of breath, difficulty breathing, fever, muscle aches, body aches, GI symptoms, or blue lips/nose/fingertips  Review of Systems   Review of Systems   Constitutional: Negative for chills, fatigue and fever  HENT: Negative for congestion, ear discharge, ear pain, postnasal drip, rhinorrhea, sinus pressure, sinus pain and sore throat  Eyes: Negative for visual disturbance  Respiratory: Positive for cough  Negative for chest tightness, shortness of breath and wheezing  Cardiovascular: Positive for chest pain  Negative for palpitations  Gastrointestinal: Positive for nausea  Negative for abdominal pain, diarrhea and vomiting  Musculoskeletal: Negative for arthralgias and myalgias  Neurological: Negative for dizziness, weakness, numbness and headaches  Psychiatric/Behavioral: Negative for behavioral problems and confusion           Current Medications       Current Outpatient Medications:     mirtazapine (REMERON) 15 mg tablet, Take 15 mg by mouth daily at bedtime, Disp: , Rfl:     patient supplied medication, medical marijuana, Disp: , Rfl:     ondansetron (ZOFRAN-ODT) 4 mg disintegrating tablet, Take 1 tablet (4 mg total) by mouth every 6 (six) hours as needed for nausea or vomiting (Patient not taking: Reported on 1/14/2021), Disp: 10 tablet, Rfl: 0    ziprasidone (GEODON) 40 mg capsule, ziprasidone 40 mg capsule  take 1 capsule by mouth at bedtime, Disp: , Rfl:     Current Allergies     Allergies as of 01/14/2021    (No Known Allergies)            The following portions of the patient's history were reviewed and updated as appropriate: allergies, current medications, past family history, past medical history, past social history, past surgical history and problem list      Past Medical History: Diagnosis Date    Anxiety     Bipolar disorder [facility])        Past Surgical History:   Procedure Laterality Date    MOLE EXCISION Left        History reviewed  No pertinent family history  Medications have been verified  Objective   Pulse 85   Temp 98 °F (36 7 °C)   Resp 18   Ht 5' 10" (1 778 m)   Wt 68 kg (150 lb)   SpO2 97%   BMI 21 52 kg/m²        Physical Exam     Physical Exam  Constitutional:       General: He is not in acute distress  Appearance: Normal appearance  He is not ill-appearing or diaphoretic  HENT:      Nose: Nose normal       Mouth/Throat:      Mouth: Mucous membranes are moist       Pharynx: Oropharynx is clear  Eyes:      Conjunctiva/sclera: Conjunctivae normal    Cardiovascular:      Rate and Rhythm: Normal rate and regular rhythm  Heart sounds: Normal heart sounds  Pulmonary:      Effort: Pulmonary effort is normal       Breath sounds: Normal breath sounds  Musculoskeletal:      Comments: There is a spasm over the right pectoral muscle, felt over the area where patient is having pain  Pain is worsened with palpation over this area as well   Skin:     General: Skin is warm and dry  Neurological:      Mental Status: He is alert     Psychiatric:         Mood and Affect: Mood normal          Behavior: Behavior normal

## 2021-01-14 NOTE — TELEPHONE ENCOUNTER
Regarding: COVID - Symptomatic - Pain in chase area, Coughing   ----- Message from Amiare sent at 1/14/2021 10:59 AM EST -----  "I was at my friends house yesterday and she just came back positive   I have coughing, nausea, pain on the right side in my chase when I cough or move "

## 2021-01-16 LAB — SARS-COV-2 N GENE RESP QL NAA+PROBE: NEGATIVE

## 2021-03-11 ENCOUNTER — NURSE TRIAGE (OUTPATIENT)
Dept: OTHER | Facility: OTHER | Age: 41
End: 2021-03-11

## 2021-03-11 DIAGNOSIS — Z20.828 EXPOSURE TO SARS VIRUS: ICD-10-CM

## 2021-03-11 DIAGNOSIS — Z20.828 EXPOSURE TO SARS VIRUS: Primary | ICD-10-CM

## 2021-03-11 PROCEDURE — U0003 INFECTIOUS AGENT DETECTION BY NUCLEIC ACID (DNA OR RNA); SEVERE ACUTE RESPIRATORY SYNDROME CORONAVIRUS 2 (SARS-COV-2) (CORONAVIRUS DISEASE [COVID-19]), AMPLIFIED PROBE TECHNIQUE, MAKING USE OF HIGH THROUGHPUT TECHNOLOGIES AS DESCRIBED BY CMS-2020-01-R: HCPCS | Performed by: FAMILY MEDICINE

## 2021-03-11 PROCEDURE — U0005 INFEC AGEN DETEC AMPLI PROBE: HCPCS | Performed by: FAMILY MEDICINE

## 2021-03-11 NOTE — TELEPHONE ENCOUNTER
Attempted call  Received voicemail and a message was left that the call will be attempted again in 10-15 minutes

## 2021-03-11 NOTE — TELEPHONE ENCOUNTER
1  Were you within 6 feet or less, for up to 15 minutes or more with a person that has a confirmed COVID-19 test? Coworkers wife came back positive  2  What was the date of your exposure? 3/9/2011 was the last contact  3  Are you experiencing any symptoms attributed to the virus?  (Assess for SOB, cough, fever, difficulty breathing)  Diarrhea, cough  Fatigue with steps  4  HIGH RISK: Do you have any history heart or lung conditions, weakened immune system, diabetes, Asthma, CHF, HIV, COPD, Chemo, renal failure, sickle cell, etc? None  5

## 2021-03-11 NOTE — TELEPHONE ENCOUNTER
Regarding: Covid-19 (Exposed) - Asymptomatic   ----- Message from Bam Martin sent at 3/11/2021  6:56 AM EST -----  "I was exposed recently and would like to be tested "

## 2021-03-11 NOTE — TELEPHONE ENCOUNTER
Reason for Disposition   [1] COVID-19 infection suspected by caller or triager AND [2] mild symptoms (cough, fever, or others) AND [8] no complications or SOB    Protocols used: CORONAVIRUS (COVID-19) [facility]NOSED [facility]USPECT[facility]

## 2021-03-12 LAB — SARS-COV-2 RNA RESP QL NAA+PROBE: NEGATIVE

## 2021-05-08 ENCOUNTER — APPOINTMENT (OUTPATIENT)
Dept: RADIOLOGY | Facility: CLINIC | Age: 41
End: 2021-05-08
Payer: COMMERCIAL

## 2021-05-08 ENCOUNTER — OFFICE VISIT (OUTPATIENT)
Dept: URGENT CARE | Facility: CLINIC | Age: 41
End: 2021-05-08
Payer: COMMERCIAL

## 2021-05-08 VITALS
RESPIRATION RATE: 18 BRPM | SYSTOLIC BLOOD PRESSURE: 130 MMHG | HEART RATE: 93 BPM | DIASTOLIC BLOOD PRESSURE: 76 MMHG | OXYGEN SATURATION: 97 % | TEMPERATURE: 98.5 F

## 2021-05-08 DIAGNOSIS — S93.602A FOOT SPRAIN, LEFT, INITIAL ENCOUNTER: Primary | ICD-10-CM

## 2021-05-08 DIAGNOSIS — M79.672 LEFT FOOT PAIN: ICD-10-CM

## 2021-05-08 PROCEDURE — 73630 X-RAY EXAM OF FOOT: CPT

## 2021-05-08 PROCEDURE — 99213 OFFICE O/P EST LOW 20 MIN: CPT | Performed by: PHYSICIAN ASSISTANT

## 2021-05-08 RX ORDER — QUETIAPINE FUMARATE 50 MG/1
TABLET, FILM COATED ORAL
Status: ON HOLD | COMMUNITY
End: 2021-05-14 | Stop reason: SDUPTHER

## 2021-05-08 RX ORDER — QUETIAPINE FUMARATE 50 MG/1
50 TABLET, FILM COATED ORAL
COMMUNITY
Start: 2021-05-06 | End: 2021-09-29 | Stop reason: HOSPADM

## 2021-05-08 NOTE — PROGRESS NOTES
[facility]    NAME: Jd Drummond is a 38 y o  male  : 1980    MRN: [id number]  DATE: May 8, 2021  TIME: 5:14 PM    Assessment and Plan   Foot sprain, left, initial encounter [S93 [id number]]  1  Foot sprain, left, initial encounter     2  Left foot pain  XR foot 3+ vw left       Patient Instructions   Patient Instructions   Xray appears negative for any fracture  Will follow up with radiologist report when available  Recommend elevating body part, icing the area every 2 hours for 20-30 minutes, take Ibuprofen every 6-8 hours to reduce inflammation  If not improving over the next week, follow up with PCP or orthopedics  Chief Complaint     Chief Complaint   Patient presents with    Ankle Pain     Pt c/o left ankle pain after overturning it earlier today  History of Present Illness     25-year-old male here with complaint of left foot pain and injury  Patient states he tripped over his dog earlier today  States that he had go to the bathroom very quickly interval within the upstairs bathroom had a run down the stairs and go outside to pee  Well doing that he noticed that his left foot was hurting  No swelling  Foot is very painful  Review of Systems   Review of Systems   Constitutional: Negative for chills and fever  Respiratory: Negative for cough and shortness of breath  Cardiovascular: Negative for chest pain     Musculoskeletal:        Left foot pain, injury       Current Medications     Current Outpatient Medications:     mirtazapine (REMERON) 15 mg tablet, Take 15 mg by mouth daily at bedtime, Disp: , Rfl:     ondansetron (ZOFRAN-ODT) 4 mg disintegrating tablet, Take 1 tablet (4 mg total) by mouth every 6 (six) hours as needed for nausea or vomiting (Patient not taking: Reported on 2021), Disp: 10 tablet, Rfl: 0    patient supplied medication, medical marijuana, Disp: , Rfl:     QUEtiapine (SEROquel) 50 mg tablet, quetiapine 50 mg tablet  take 1 tablet by mouth at bedtime, Disp: , Rfl:     QUEtiapine (SEROquel) 50 mg tablet, Take 50 mg by mouth daily at bedtime, Disp: , Rfl:     ziprasidone (GEODON) 40 mg capsule, ziprasidone 40 mg capsule  take 1 capsule by mouth at bedtime, Disp: , Rfl:     Current Allergies     Allergies as of 05/08/2021    (No Known Allergies)          The following portions of the patient's history were reviewed and updated as appropriate: allergies, current medications, past family history, past medical history, past social history, past surgical history and problem list    Past Medical History:   Diagnosis Date    Anxiety     Bipolar disorder ([facility] [address] )      Past Surgical History:   Procedure Laterality Date    MOLE EXCISION Left      No family history on file    Social History     Socioeconomic History    Marital status: Single     Spouse name: Not on file    Number of children: Not on file    Years of education: Not on file    Highest education level: Not on file   Occupational History    Not on file   Social Needs    Financial resource strain: Not on file    Food insecurity     Worry: Not on file     Inability: Not on file    Transportation needs     Medical: Not on file     Non-medical: Not on file   Tobacco Use    Smoking status: Current Every Day Smoker     Packs/day: 0 50    Smokeless tobacco: Never Used   Substance and Sexual Activity    Alcohol use: Yes     Frequency: Monthly or less    Drug use: Yes     Types: Marijuana    Sexual activity: Not on file   Lifestyle    Physical activity     Days per week: Not on file     Minutes per session: Not on file    Stress: Not on file   Relationships    Social connections     Talks on phone: Not on file     Gets together: Not on file     Attends Muslim service: Not on file     Active member of club or organization: Not on file     Attends meetings of clubs or organizations: Not on file     Relationship status: Not on file    Intimate partner violence     Fear of current or ex partner: Not on file     Emotionally abused: Not on file     Physically abused: Not on file     Forced sexual activity: Not on file   Other Topics Concern    Not on file   Social History Narrative    Not on file     Medications have been verified  Objective   /76   Pulse 93   Temp 98 5 °F (36 9 °C)   Resp 18   SpO2 97%      Physical Exam   Physical Exam  Vitals signs and nursing note reviewed  Constitutional:       Appearance: Normal appearance  HENT:      Head: Normocephalic and atraumatic  Right Ear: Tympanic membrane normal       Left Ear: Tympanic membrane normal    Neck:      Musculoskeletal: Normal range of motion  Cardiovascular:      Rate and Rhythm: Normal rate and regular rhythm  Pulses: Normal pulses  Heart sounds: Normal heart sounds  Pulmonary:      Effort: Pulmonary effort is normal       Breath sounds: Normal breath sounds  Musculoskeletal:      Left foot: Decreased range of motion  Tenderness present  No swelling  Neurological:      Mental Status: He is alert

## 2021-05-14 ENCOUNTER — HOSPITAL ENCOUNTER (OUTPATIENT)
Facility: HOSPITAL | Age: 41
Setting detail: OBSERVATION
Discharge: LEFT BEFORE DISCHARGE | End: 2021-05-15
Attending: EMERGENCY MEDICINE | Admitting: SPECIALIST
Payer: COMMERCIAL

## 2021-05-14 ENCOUNTER — APPOINTMENT (OUTPATIENT)
Dept: ULTRASOUND IMAGING | Facility: HOSPITAL | Age: 41
End: 2021-05-14
Payer: COMMERCIAL

## 2021-05-14 ENCOUNTER — APPOINTMENT (EMERGENCY)
Dept: CT IMAGING | Facility: HOSPITAL | Age: 41
End: 2021-05-14
Payer: COMMERCIAL

## 2021-05-14 DIAGNOSIS — K81.9 CHOLECYSTITIS: Primary | ICD-10-CM

## 2021-05-14 DIAGNOSIS — N28.89 LEFT RENAL MASS: ICD-10-CM

## 2021-05-14 PROBLEM — K80.00 ACUTE CHOLECYSTITIS DUE TO BILIARY CALCULUS: Status: ACTIVE | Noted: 2021-05-14

## 2021-05-14 LAB
ALBUMIN SERPL BCP-MCNC: 4 G/DL (ref 3.5–5.7)
ALP SERPL-CCNC: 58 U/L (ref 40–150)
ALT SERPL W P-5'-P-CCNC: 15 U/L (ref 7–52)
ANION GAP SERPL CALCULATED.3IONS-SCNC: 9 MMOL/L (ref 4–13)
AST SERPL W P-5'-P-CCNC: 19 U/L (ref 13–39)
BASOPHILS # BLD AUTO: 0 THOUSANDS/ΜL (ref 0–0.1)
BASOPHILS NFR BLD AUTO: 1 % (ref 0–2)
BILIRUB SERPL-MCNC: 0.9 MG/DL (ref 0.2–1)
BUN SERPL-MCNC: 6 MG/DL (ref 7–25)
CALCIUM SERPL-MCNC: 9 MG/DL (ref 8.6–10.5)
CHLORIDE SERPL-SCNC: 104 MMOL/L (ref 98–107)
CO2 SERPL-SCNC: 24 MMOL/L (ref 21–31)
CREAT SERPL-MCNC: 0.76 MG/DL (ref 0.7–1.3)
EOSINOPHIL # BLD AUTO: 0.1 THOUSAND/ΜL (ref 0–0.61)
EOSINOPHIL NFR BLD AUTO: 1 % (ref 0–5)
ERYTHROCYTE [DISTWIDTH] IN BLOOD BY AUTOMATED COUNT: 12.9 % (ref 11.5–14.5)
GFR SERPL CREATININE-BSD FRML MDRD: 113 ML/MIN/1.73SQ M
GLUCOSE SERPL-MCNC: 123 MG/DL (ref 65–99)
HCT VFR BLD AUTO: 44.2 % (ref 42–47)
HGB BLD-MCNC: 15 G/DL (ref 14–18)
LIPASE SERPL-CCNC: 47 U/L (ref 11–82)
LYMPHOCYTES # BLD AUTO: 1 THOUSANDS/ΜL (ref 0.6–4.47)
LYMPHOCYTES NFR BLD AUTO: 12 % (ref 21–51)
MCH RBC QN AUTO: 32.5 PG (ref 26–34)
MCHC RBC AUTO-ENTMCNC: 34 G/DL (ref 31–37)
MCV RBC AUTO: 96 FL (ref 81–99)
MONOCYTES # BLD AUTO: 0.4 THOUSAND/ΜL (ref 0.17–1.22)
MONOCYTES NFR BLD AUTO: 5 % (ref 2–12)
NEUTROPHILS # BLD AUTO: 7 THOUSANDS/ΜL (ref 1.4–6.5)
NEUTS SEG NFR BLD AUTO: 82 % (ref 42–75)
PLATELET # BLD AUTO: 148 THOUSANDS/UL (ref 149–390)
PMV BLD AUTO: 8.9 FL (ref 8.6–11.7)
POTASSIUM SERPL-SCNC: 3.7 MMOL/L (ref 3.5–5.5)
PROT SERPL-MCNC: 5.9 G/DL (ref 6.4–8.9)
RBC # BLD AUTO: 4.62 MILLION/UL (ref 4.3–5.9)
SARS-COV-2 RNA RESP QL NAA+PROBE: NEGATIVE
SODIUM SERPL-SCNC: 137 MMOL/L (ref 134–143)
WBC # BLD AUTO: 8.6 THOUSAND/UL (ref 4.8–10.8)

## 2021-05-14 PROCEDURE — G1004 CDSM NDSC: HCPCS

## 2021-05-14 PROCEDURE — 99285 EMERGENCY DEPT VISIT HI MDM: CPT | Performed by: EMERGENCY MEDICINE

## 2021-05-14 PROCEDURE — U0003 INFECTIOUS AGENT DETECTION BY NUCLEIC ACID (DNA OR RNA); SEVERE ACUTE RESPIRATORY SYNDROME CORONAVIRUS 2 (SARS-COV-2) (CORONAVIRUS DISEASE [COVID-19]), AMPLIFIED PROBE TECHNIQUE, MAKING USE OF HIGH THROUGHPUT TECHNOLOGIES AS DESCRIBED BY CMS-2020-01-R: HCPCS | Performed by: EMERGENCY MEDICINE

## 2021-05-14 PROCEDURE — 80053 COMPREHEN METABOLIC PANEL: CPT | Performed by: EMERGENCY MEDICINE

## 2021-05-14 PROCEDURE — 76705 ECHO EXAM OF ABDOMEN: CPT

## 2021-05-14 PROCEDURE — U0005 INFEC AGEN DETEC AMPLI PROBE: HCPCS | Performed by: EMERGENCY MEDICINE

## 2021-05-14 PROCEDURE — 85025 COMPLETE CBC W/AUTO DIFF WBC: CPT | Performed by: EMERGENCY MEDICINE

## 2021-05-14 PROCEDURE — 74177 CT ABD & PELVIS W/CONTRAST: CPT

## 2021-05-14 PROCEDURE — 96361 HYDRATE IV INFUSION ADD-ON: CPT

## 2021-05-14 PROCEDURE — 96374 THER/PROPH/DIAG INJ IV PUSH: CPT

## 2021-05-14 PROCEDURE — 96375 TX/PRO/DX INJ NEW DRUG ADDON: CPT

## 2021-05-14 PROCEDURE — 71260 CT THORAX DX C+: CPT

## 2021-05-14 PROCEDURE — 36415 COLL VENOUS BLD VENIPUNCTURE: CPT | Performed by: EMERGENCY MEDICINE

## 2021-05-14 PROCEDURE — 99218 PR INITIAL OBSERVATION CARE/DAY 30 MINUTES: CPT | Performed by: SPECIALIST

## 2021-05-14 PROCEDURE — 99285 EMERGENCY DEPT VISIT HI MDM: CPT

## 2021-05-14 PROCEDURE — 83690 ASSAY OF LIPASE: CPT | Performed by: EMERGENCY MEDICINE

## 2021-05-14 RX ORDER — ONDANSETRON 2 MG/ML
4 INJECTION INTRAMUSCULAR; INTRAVENOUS ONCE
Status: COMPLETED | OUTPATIENT
Start: 2021-05-14 | End: 2021-05-14

## 2021-05-14 RX ORDER — NICOTINE 21 MG/24HR
1 PATCH, TRANSDERMAL 24 HOURS TRANSDERMAL DAILY
Status: DISCONTINUED | OUTPATIENT
Start: 2021-05-14 | End: 2021-05-15 | Stop reason: HOSPADM

## 2021-05-14 RX ORDER — MIRTAZAPINE 15 MG/1
15 TABLET, FILM COATED ORAL
Status: DISCONTINUED | OUTPATIENT
Start: 2021-05-14 | End: 2021-05-15 | Stop reason: HOSPADM

## 2021-05-14 RX ORDER — QUETIAPINE FUMARATE 50 MG/1
50 TABLET, FILM COATED ORAL
Status: DISCONTINUED | OUTPATIENT
Start: 2021-05-14 | End: 2021-05-15 | Stop reason: HOSPADM

## 2021-05-14 RX ORDER — ONDANSETRON 2 MG/ML
4 INJECTION INTRAMUSCULAR; INTRAVENOUS EVERY 6 HOURS PRN
Status: DISCONTINUED | OUTPATIENT
Start: 2021-05-14 | End: 2021-05-15 | Stop reason: HOSPADM

## 2021-05-14 RX ORDER — LORAZEPAM 2 MG/ML
1 INJECTION INTRAMUSCULAR EVERY 6 HOURS PRN
Status: DISCONTINUED | OUTPATIENT
Start: 2021-05-14 | End: 2021-05-15 | Stop reason: HOSPADM

## 2021-05-14 RX ORDER — DEXTROSE, SODIUM CHLORIDE, AND POTASSIUM CHLORIDE 5; .45; .15 G/100ML; G/100ML; G/100ML
125 INJECTION INTRAVENOUS CONTINUOUS
Status: DISCONTINUED | OUTPATIENT
Start: 2021-05-14 | End: 2021-05-15 | Stop reason: HOSPADM

## 2021-05-14 RX ADMIN — ENOXAPARIN SODIUM 40 MG: 40 INJECTION SUBCUTANEOUS at 15:12

## 2021-05-14 RX ADMIN — IOHEXOL 50 ML: 240 INJECTION, SOLUTION INTRATHECAL; INTRAVASCULAR; INTRAVENOUS; ORAL at 09:12

## 2021-05-14 RX ADMIN — MIRTAZAPINE 15 MG: 15 TABLET, FILM COATED ORAL at 21:17

## 2021-05-14 RX ADMIN — SODIUM CHLORIDE 1000 ML: 0.9 INJECTION, SOLUTION INTRAVENOUS at 09:02

## 2021-05-14 RX ADMIN — NICOTINE 1 PATCH: 21 PATCH, EXTENDED RELEASE TRANSDERMAL at 15:12

## 2021-05-14 RX ADMIN — PIPERACILLIN SODIUM AND TAZOBACTAM SODIUM 3.38 G: 3; .375 INJECTION, POWDER, LYOPHILIZED, FOR SOLUTION INTRAVENOUS at 17:34

## 2021-05-14 RX ADMIN — PIPERACILLIN SODIUM AND TAZOBACTAM SODIUM 3.38 G: 3; .375 INJECTION, POWDER, LYOPHILIZED, FOR SOLUTION INTRAVENOUS at 22:36

## 2021-05-14 RX ADMIN — DEXTROSE, SODIUM CHLORIDE, AND POTASSIUM CHLORIDE 125 ML/HR: 5; .45; .15 INJECTION INTRAVENOUS at 15:12

## 2021-05-14 RX ADMIN — MORPHINE SULFATE 2 MG: 2 INJECTION, SOLUTION INTRAMUSCULAR; INTRAVENOUS at 12:27

## 2021-05-14 RX ADMIN — QUETIAPINE FUMARATE 50 MG: 50 TABLET ORAL at 21:17

## 2021-05-14 RX ADMIN — ONDANSETRON 4 MG: 2 INJECTION INTRAMUSCULAR; INTRAVENOUS at 09:03

## 2021-05-14 RX ADMIN — IOHEXOL 100 ML: 350 INJECTION, SOLUTION INTRAVENOUS at 11:03

## 2021-05-14 NOTE — PLAN OF CARE
Problem: GASTROINTESTINAL - ADULT  Goal: Minimal or absence of nausea and/or vomiting  Description: INTERVENTIONS:  - Administer IV fluids if ordered to ensure adequate hydration  - Maintain NPO status until nausea and vomiting are resolved  - Nasogastric tube if ordered  - Administer ordered antiemetic medications as needed  - Provide nonpharmacologic comfort measures as appropriate  - Advance diet as tolerated, if ordered  - Consider nutrition services referral to assist patient with adequate nutrition and appropriate food choices  Outcome: Progressing  Goal: Maintains or returns to baseline bowel function  Description: INTERVENTIONS:  - Assess bowel function  - Encourage oral fluids to ensure adequate hydration  - Administer IV fluids if ordered to ensure adequate hydration  - Administer ordered medications as needed  - Encourage mobilization and activity  - Consider nutritional services referral to assist patient with adequate nutrition and appropriate food choices  Outcome: Progressing  Goal: Maintains adequate nutritional intake  Description: INTERVENTIONS:  - Monitor percentage of each meal consumed  - Identify factors contributing to decreased intake, treat as appropriate  - Assist with meals as needed  - Monitor I&O, weight, and lab values if indicated  - Obtain nutrition services referral as needed  Outcome: Progressing     Problem: METABOLIC, FLUID AND ELECTROLYTES - ADULT  Goal: Electrolytes maintained within normal limits  Description: INTERVENTIONS:  - Monitor labs and assess patient for signs and symptoms of electrolyte imbalances  - Administer electrolyte replacement as ordered  - Monitor response to electrolyte replacements, including repeat lab results as appropriate  - Instruct patient on fluid and nutrition as appropriate  Outcome: Progressing     Problem: SKIN/TISSUE INTEGRITY - ADULT  Goal: Skin integrity remains intact  Description: INTERVENTIONS  - Identify patients at risk for skin breakdown  - Assess and monitor skin integrity  - Assess and monitor nutrition and hydration status  - Monitor labs (i e  albumin)  - Assess for incontinence   - Turn and reposition patient  - Assist with mobility/ambulation  - Relieve pressure over bony prominences  - Avoid friction and shearing  - Provide appropriate hygiene as needed including keeping skin clean and dry  - Evaluate need for skin moisturizer/barrier cream  - Collaborate with interdisciplinary team (i e  Nutrition, Rehabilitation, etc )   - Patient/family teaching  Outcome: Progressing     Problem: MUSCULOSKELETAL - ADULT  Goal: Maintain or return mobility to safest level of function  Description: INTERVENTIONS:  - Assess patient's ability to carry out ADLs; assess patient's baseline for ADL function and identify physical deficits which impact ability to perform ADLs (bathing, care of mouth/teeth, toileting, grooming, dressing, etc )  - Assess/evaluate cause of self-care deficits   - Assess range of motion  - Assess patient's mobility  - Assess patient's need for assistive devices and provide as appropriate  - Encourage maximum independence but intervene and supervise when necessary  - Involve family in performance of ADLs  - Assess for home care needs following discharge   - Consider OT consult to assist with ADL evaluation and planning for discharge  - Provide patient education as appropriate  Outcome: Progressing     Problem: PAIN - ADULT  Goal: Verbalizes/displays adequate comfort level or baseline comfort level  Description: Interventions:  - Encourage patient to monitor pain and request assistance  - Assess pain using appropriate pain scale  - Administer analgesics based on type and severity of pain and evaluate response  - Implement non-pharmacological measures as appropriate and evaluate response  - Consider cultural and social influences on pain and pain management  - Notify physician/advanced practitioner if interventions unsuccessful or patient reports new pain  Outcome: Progressing     Problem: INFECTION - ADULT  Goal: Absence or prevention of progression during hospitalization  Description: INTERVENTIONS:  - Assess and monitor for signs and symptoms of infection  - Monitor lab/diagnostic results  - Monitor all insertion sites, i e  indwelling lines, tubes, and drains  - Monitor endotracheal if appropriate and nasal secretions for changes in amount and color  - [place] appropriate cooling/warming therapies per order  - Administer medications as ordered  - Instruct and encourage patient and family to use good hand hygiene technique  - Identify and instruct in appropriate isolation precautions for identified infection/condition  Outcome: Progressing     Problem: SAFETY ADULT  Goal: Patient will remain free of falls  Description: INTERVENTIONS:  - Assess patient frequently for physical needs  -  Identify cognitive and physical deficits and behaviors that affect risk of falls    -  [place] fall precautions as indicated by assessment   - Educate patient/family on patient safety including physical limitations  - Instruct patient to call for assistance with activity based on assessment  - Modify environment to reduce risk of injury  - Consider OT/PT consult to assist with strengthening/mobility  Outcome: Progressing  Goal: Maintain or return to baseline ADL function  Description: INTERVENTIONS:  -  Assess patient's ability to carry out ADLs; assess patient's baseline for ADL function and identify physical deficits which impact ability to perform ADLs (bathing, care of mouth/teeth, toileting, grooming, dressing, etc )  - Assess/evaluate cause of self-care deficits   - Assess range of motion  - Assess patient's mobility; develop plan if impaired  - Assess patient's need for assistive devices and provide as appropriate  - Encourage maximum independence but intervene and supervise when necessary  - Involve family in performance of ADLs  - Assess for home care needs following discharge   - Consider OT consult to assist with ADL evaluation and planning for discharge  - Provide patient education as appropriate  Outcome: Progressing  Goal: Maintain or return mobility status to optimal level  Description: INTERVENTIONS:  - Assess patient's baseline mobility status (ambulation, transfers, stairs, etc )    - Identify cognitive and physical deficits and behaviors that affect mobility  - Identify mobility aids required to assist with transfers and/or ambulation (gait belt, sit-to-stand, lift, walker, cane, etc )  - [place] fall precautions as indicated by assessment  - Record patient progress and toleration of activity level on Mobility SBAR; progress patient to next Phase/Stage  - Instruct patient to call for assistance with activity based on assessment  - Consider rehabilitation consult to assist with strengthening/weightbearing, etc   Outcome: Progressing     Problem: DISCHARGE PLANNING  Goal: Discharge to home or other facility with appropriate resources  Description: INTERVENTIONS:  - Identify barriers to discharge w/patient and caregiver  - Arrange for needed discharge resources and transportation as appropriate  - Identify discharge learning needs (meds, wound care, etc )  - Refer to Case Management Department for coordinating discharge planning if the patient needs post-hospital services based on physician/advanced practitioner order or complex needs related to functional status, cognitive ability, or social support system  Outcome: Progressing     Problem: Knowledge Deficit  Goal: Patient/family/caregiver demonstrates understanding of disease process, treatment plan, medications, and discharge instructions  Description: Complete learning assessment and assess knowledge base    Interventions:  - Provide teaching at level of understanding  - Provide teaching via preferred learning methods  Outcome: Progressing

## 2021-05-14 NOTE — ED PROVIDER NOTES
History  Chief Complaint   Patient presents with    Vomiting     ate at a [employer] last night  has been vomiting since   Chills     16-year-old male presents emergency department complaining of a mid abdominal pain and vomiting starting around 3:00 a m  Today  Patient notes that he has been throwing up and feels weak and has chills  He notes the 8 at [facility] around 12 midnight today  He is not sure if he has food poisoning  On top of this, he notes that he is on medical marijuana and gets his marijuana from a dispensary  Patient notes that he had smoked marijuana around 1AM in the morning  Patient denies fever or chills  Prior to Admission Medications   Prescriptions Last Dose Informant Patient Reported? Taking? QUEtiapine (SEROquel) 50 mg tablet 5/13/2021 at Unknown time  Yes Yes   Sig: Take 50 mg by mouth daily at bedtime   mirtazapine (REMERON) 15 mg tablet 5/13/2021 at Unknown time  Yes Yes   Sig: Take 15 mg by mouth daily at bedtime   ondansetron (ZOFRAN-ODT) 4 mg disintegrating tablet   No No   Sig: Take 1 tablet (4 mg total) by mouth every 6 (six) hours as needed for nausea or vomiting   Patient not taking: Reported on 1/14/2021   patient supplied medication   Yes No   Sig: medical marijuana      Facility-Administered Medications: None       Past Medical History:   Diagnosis Date    Anxiety     Bipolar disorder ([facility] [address] )        Past Surgical History:   Procedure Laterality Date    HERNIA REPAIR      MOLE EXCISION Left        History reviewed  No pertinent family history  I have reviewed and agree with the history as documented      E-Cigarette/Vaping    E-Cigarette Use Current Every Day User      E-Cigarette/Vaping Substances    THC Yes      Social History     Tobacco Use    Smoking status: Current Every Day Smoker     Packs/day: 1 00    Smokeless tobacco: Never Used   Substance Use Topics    Alcohol use: Never     Frequency: Monthly or less    Drug use: Yes     Types: Marijuana       Review of Systems   Constitutional: Positive for fatigue  Negative for chills and fever  HENT: Negative for ear pain and sore throat  Eyes: Negative for pain and visual disturbance  Respiratory: Negative for cough, shortness of breath and wheezing  Cardiovascular: Negative for chest pain and palpitations  Gastrointestinal: Positive for abdominal pain, nausea and vomiting  Endocrine: Negative for cold intolerance and heat intolerance  Genitourinary: Negative for dysuria and hematuria  Musculoskeletal: Negative for arthralgias and back pain  Skin: Negative for color change and rash  Neurological: Positive for tremors and weakness  Negative for seizures and syncope  All other systems reviewed and are negative  Physical Exam  Physical Exam  Constitutional:       General: He is not in acute distress  Appearance: Normal appearance  He is normal weight  He is not ill-appearing  HENT:      Head: Normocephalic and atraumatic  Right Ear: External ear normal       Left Ear: External ear normal       Nose: Nose normal       Mouth/Throat:      Mouth: Mucous membranes are moist    Eyes:      Conjunctiva/sclera: Conjunctivae normal    Neck:      Musculoskeletal: Normal range of motion  Cardiovascular:      Rate and Rhythm: Normal rate and regular rhythm  Pulses: Normal pulses  Heart sounds: Normal heart sounds  Pulmonary:      Effort: Pulmonary effort is normal       Breath sounds: Normal breath sounds  Abdominal:      General: Abdomen is flat  There is no distension  Palpations: Abdomen is soft  There is no mass  Tenderness: There is abdominal tenderness  There is guarding  There is no rebound  Comments: Tenderness in the mid abdomen, right upper quadrant, and epigastrium  Musculoskeletal: Normal range of motion  General: No swelling, tenderness or deformity  Skin:     General: Skin is warm and dry        Capillary Refill: Capillary refill takes 2 to 3 seconds  Coloration: Skin is not pale  Neurological:      General: No focal deficit present  Mental Status: He is alert and oriented to person, place, and time  Mental status is at baseline     Psychiatric:         Mood and Affect: Mood normal          Vital Signs  ED Triage Vitals [05/14/21 0833]   Temperature Pulse Respirations Blood Pressure SpO2   97 8 °F (36 6 °C) 64 20 141/84 100 %      Temp Source Heart Rate Source Patient Position - Orthostatic VS BP Location FiO2 (%)   Temporal Monitor Sitting Left arm --      Pain Score       5           Vitals:    05/14/21 0833 05/14/21 1115 05/14/21 1130 05/14/21 1446   BP: 141/84 134/85 125/85 129/84   Pulse: 64 56 64 69   Patient Position - Orthostatic VS: Sitting   Lying         Visual Acuity      ED Medications  Medications   dextrose 5 % and sodium chloride 0 45 % with KCl 20 mEq/L infusion (has no administration in time range)   nicotine (NICODERM CQ) 21 mg/24 hr TD 24 hr patch 1 patch (has no administration in time range)   enoxaparin (LOVENOX) subcutaneous injection 40 mg (has no administration in time range)   piperacillin-tazobactam (ZOSYN) 3 375 g in sodium chloride 0 9 % 100 mL IVPB (has no administration in time range)   morphine injection 2 mg (has no administration in time range)   ondansetron (ZOFRAN) injection 4 mg (has no administration in time range)   pneumococcal 23-valent polysaccharide vaccine (PNEUMOVAX-23) injection 0 5 mL (has no administration in time range)   sodium chloride 0 9 % bolus 1,000 mL (0 mL Intravenous Stopped 5/14/21 1105)   ondansetron (ZOFRAN) injection 4 mg (4 mg Intravenous Given 5/14/21 0903)   iohexol (OMNIPAQUE) 240 MG/ML solution 50 mL (50 mL Oral Given 5/14/21 0912)   iohexol (OMNIPAQUE) 350 MG/ML injection (MULTI-DOSE) 100 mL (100 mL Intravenous Given 5/14/21 1103)   morphine injection 2 mg (2 mg Intravenous Given 5/14/21 1227)       Diagnostic Studies  Results Reviewed     Procedure Component Value Units Date/Time    Novel Coronavirus (Covid-19),PCR [facility] - 2 Hour Stat [[id number]]  (Normal) Collected: 05/14/21 1237    Lab Status: Final result Specimen: Nares from Nasopharyngeal Swab Updated: 05/14/21 1342     SARS-CoV-2 Negative    Narrative: The specimen collection materials, transport medium, and/or testing methodology utilized in the production of these test results have been proven to be reliable in a limited validation with an abbreviated program under the Emergency Utilization Authorization provided by the FDA  Testing reported as "Presumptive positive" will be confirmed with secondary testing to ensure result accuracy  Clinical caution and judgement should be used with the interpretation of these results with consideration of the clinical impression and other laboratory testing  Testing reported as "Positive" or "Negative" has been proven to be accurate according to standard laboratory validation requirements  All testing is performed with control materials showing appropriate reactivity at standard intervals        Comprehensive metabolic panel [[id number]]  (Abnormal) Collected: 05/14/21 0844    Lab Status: Final result Specimen: Blood from Arm, Left Updated: 05/14/21 0917     Sodium 137 mmol/L      Potassium 3 7 mmol/L      Chloride 104 mmol/L      CO2 24 mmol/L      ANION GAP 9 mmol/L      BUN 6 mg/dL      Creatinine 0 76 mg/dL      Glucose 123 mg/dL      Calcium 9 0 mg/dL      AST 19 U/L      ALT 15 U/L      Alkaline Phosphatase 58 U/L      Total Protein 5 9 g/dL      Albumin 4 0 g/dL      Total Bilirubin 0 90 mg/dL      eGFR 113 ml/min/1 73sq m     Narrative:      [facility] guidelines for Chronic Kidney Disease (CKD):     Stage 1 with normal or high GFR (GFR > 90 mL/min/1 73 square meters)    Stage 2 Mild CKD (GFR = 60-89 mL/min/1 73 square meters)    Stage 3A Moderate CKD (GFR = 45-59 mL/min/1 73 square meters)    Stage 3B Moderate CKD (GFR = 30-44 mL/min/1 73 square meters)    Stage 4 Severe CKD (GFR = 15-29 mL/min/1 73 square meters)    Stage 5 End Stage CKD (GFR <15 mL/min/1 73 square meters)  Note: GFR calculation is accurate only with a steady state creatinine    Lipase [[id number]]  (Normal) Collected: 05/14/21 0844    Lab Status: Final result Specimen: Blood from Arm, Left Updated: 05/14/21 0909     Lipase 47 u/L     CBC and differential [[id number]]  (Abnormal) Collected: 05/14/21 0844    Lab Status: Final result Specimen: Blood from Arm, Left Updated: 05/14/21 0854     WBC 8 60 Thousand/uL      RBC 4 62 Million/uL      Hemoglobin 15 0 g/dL      Hematocrit 44 2 %      MCV 96 fL      MCH 32 5 pg      MCHC 34 0 g/dL      RDW 12 9 %      MPV 8 9 fL      Platelets 932 Thousands/uL      Neutrophils Relative 82 %      Lymphocytes Relative 12 %      Monocytes Relative 5 %      Eosinophils Relative 1 %      Basophils Relative 1 %      Neutrophils Absolute 7 00 Thousands/µL      Lymphocytes Absolute 1 00 Thousands/µL      Monocytes Absolute 0 40 Thousand/µL      Eosinophils Absolute 0 10 Thousand/µL      Basophils Absolute 0 00 Thousands/µL                  US gallbladder   Final Result by Ladonna Neff MD (05/14 1442)      Findings concerning for acute calculous cholecystitis  The study was marked in [facility] for immediate notification  Workstation performed: [id number]         CT chest abdomen pelvis w contrast   Final Result by Jai Diaz MD (05/14 1120)      Lobulated hypodensity in the left kidney measuring 1 6 x 1 0 cm does not represent simple cyst and ultrasound characterization recommended    No hydronephrosis  Sludge in the gallbladder suggested with wall thickening/ pericholecystic fluid  Ultrasound correlation as clinically indicated  Workstation performed: [id number]                    Procedures  Procedures         ED Course  ED Course as of May 14 1504   Fri May 14, 2021   1131 Discussed results with patient    Will contact surgeon regarding possible cholecystitis  MDM    Disposition  Final diagnoses:   Cholecystitis   Left renal mass     Time reflects when diagnosis was documented in both MDM as applicable and the Disposition within this note     Time User Action Codes Description Comment    5/14/2021  1:48 PM Michelle Frias Add [K81 9] Cholecystitis     5/14/2021  1:48 PM Argentnia Perez Add [N28 89] Left renal mass       ED Disposition     ED Disposition Condition Date/Time Comment    Admit Stable Fri May 14, 2021  1:48 PM Case was discussed with Dr Ledy Leary and the patient's admission status was agreed to be Admission Status: observation status to the service of Dr Ledy Leary   Follow-up Information    None         Current Discharge Medication List      CONTINUE these medications which have NOT CHANGED    Details   mirtazapine (REMERON) 15 mg tablet Take 15 mg by mouth daily at bedtime      QUEtiapine (SEROquel) 50 mg tablet Take 50 mg by mouth daily at bedtime      ondansetron (ZOFRAN-ODT) 4 mg disintegrating tablet Take 1 tablet (4 mg total) by mouth every 6 (six) hours as needed for nausea or vomiting  Qty: 10 tablet, Refills: 0    Associated Diagnoses: Food poisoning      patient supplied medication medical marijuana           No discharge procedures on file      PDMP Review     None          ED Provider  Electronically Signed by           Mary Kate Baez DO  05/14/21 5893

## 2021-05-14 NOTE — H&P
H&P Exam - General Surgery   Renaldo Forde 38 y o  male MRN: [id number]  Unit/Bed#: ED 01 Encounter: [id number]    Assessment:  37 yo M with PMH bipolar and anxiety presenting with:    Acute Abdominal Pain, R/O Acute Cholecystitis   -AVSS  -WBC stable 8 60  -LFTs WNL  -CT w/ IV and oral contrast: Sludge in the gallbladder suggested with wall thickening/ pericholecystic fluid  Lobulated hypodensity in the left kidney measuring 1 6 x 1 0 cm does not represent simple cyst and ultrasound characterization recommended   -No evidence of cholelithiasis  No hx of biliary colic  Clinically presenting more as a  gastroenteritis/food poisoning picture  Will obtain further imaging studies to officially r/o acute cholecystitis  Plan:  -admit on observation status  -clear liquid diet   -IVF  -IV abx  -analgesia, antiemetics prn  -DVT ppx  -serial abdominal exams   -AM labs  -US of GB for further evaluation of possible acute cholecystitis   -FU urology outpatient recommended for US of kidney       Subjective:  CC: Abdominal pain, nausea, and vomiting x 12 hours      HPI:  Abdominal pain began suddenly last night around 1:00 am after ate a meal  Pain was described as 7/10 and constant  It is localized to the epigastric region and RUQ  Mild pain in the left upper chest that began after episodes of vomiting  Has been nauseous and had multiple episodes of vomiting, described as darker brown/black in color  Also has associated fevers/chills, but did not take temperature at home  No precipitating, exacerbating, or relieving factors  Has never had an episode like this before  Denies a history of biliary colic with fatty food intake  Family history of gallbladder disease and cholecystectomy  Smokes tobacco daily, has a medical marijuana card, denies ETOH/recreational drug use       ROS:  All other ROS review and were negative except     Historical Information   Past Medical History:   Diagnosis Date    Anxiety     Bipolar disorder [facility])      Past Surgical History:   Procedure Laterality Date    MOLE EXCISION Left      Social History   Social History     Substance and Sexual Activity   Alcohol Use Yes    Frequency: Monthly or less     Social History     Substance and Sexual Activity   Drug Use Yes    Types: Marijuana     Social History     Tobacco Use   Smoking Status Current Every Day Smoker    Packs/day: 1 00   Smokeless Tobacco Never Used     Family History: History reviewed  No pertinent family history  Meds/Allergies   all medications and allergies reviewed  No Known Allergies    Objective   First Vitals:   Blood Pressure: 141/84 (05/14/21 0833)  Pulse: 64 (05/14/21 0833)  Temperature: 97 8 °F (36 6 °C) (05/14/21 0833)  Temp Source: Temporal (05/14/21 0833)  Respirations: 20 (05/14/21 0833)  Height: 5' 10" (177 8 cm) (05/14/21 4064)  Weight - Scale: 59 kg (130 lb) (05/14/21 0833)  SpO2: 100 % (05/14/21 0833)    Current Vitals:   Blood Pressure: 125/85 (05/14/21 1130)  Pulse: 64 (05/14/21 1130)  Temperature: 97 8 °F (36 6 °C) (05/14/21 0833)  Temp Source: Temporal (05/14/21 0833)  Respirations: 20 (05/14/21 0833)  Height: 5' 10" (177 8 cm) (05/14/21 3274)  Weight - Scale: 59 kg (130 lb) (05/14/21 0833)  SpO2: 98 % (05/14/21 1130)      Intake/Output Summary (Last 24 hours) at 5/14/2021 1352  Last data filed at 5/14/2021 1105  Gross per 24 hour   Intake 1000 ml   Output --   Net 1000 ml       Invasive Devices     Peripheral Intravenous Line            Peripheral IV 05/14/21 Left Forearm less than 1 day                Physical Exam  Vitals signs and nursing note reviewed  Constitutional:       General: He is not in acute distress  Appearance: He is ill-appearing  HENT:      Head: Normocephalic and atraumatic  Right Ear: External ear normal       Left Ear: External ear normal       Nose: No rhinorrhea  Eyes:      General: No scleral icterus  Extraocular Movements: Extraocular movements intact        Conjunctiva/sclera: Conjunctivae normal    Neck:      Musculoskeletal: Normal range of motion and neck supple  Cardiovascular:      Rate and Rhythm: Normal rate and regular rhythm  Heart sounds: No murmur  Pulmonary:      Effort: Pulmonary effort is normal       Breath sounds: Normal breath sounds  Abdominal:      General: Abdomen is flat  There is no distension  Comments: Thin abdomen, tattoos present   Active bowel sounds  Generalized tenderness with light palpation  Localized tenderness to the epigastric region with deep palpation  Voluntary guarding    Musculoskeletal: Normal range of motion  General: No tenderness  Right lower leg: No edema  Left lower leg: No edema  Skin:     General: Skin is warm and dry  Coloration: Skin is not jaundiced  Comments: Skin very warm to the touch   Neurological:      General: No focal deficit present  Mental Status: He is oriented to person, place, and time  Psychiatric:         Mood and Affect: Mood normal          Thought Content: Thought content normal          Judgment: Judgment normal          Lab Results: I have personally reviewed pertinent lab results      Recent Results (from the past 36 hour(s))   CBC and differential    Collection Time: 05/14/21  8:44 AM   Result Value Ref Range    WBC 8 60 4 80 - 10 80 Thousand/uL    RBC 4 62 4 30 - 5 90 Million/uL    Hemoglobin 15 0 14 0 - 18 0 g/dL    Hematocrit 44 2 42 0 - 47 0 %    MCV 96 81 - 99 fL    MCH 32 5 26 0 - 34 0 pg    MCHC 34 0 31 0 - 37 0 g/dL    RDW 12 9 11 5 - 14 5 %    MPV 8 9 8 6 - 11 7 fL    Platelets 547 (L) 876 - 390 Thousands/uL    Neutrophils Relative 82 (H) 42 - 75 %    Lymphocytes Relative 12 (L) 21 - 51 %    Monocytes Relative 5 2 - 12 %    Eosinophils Relative 1 0 - 5 %    Basophils Relative 1 0 - 2 %    Neutrophils Absolute 7 00 (H) 1 40 - 6 50 Thousands/µL    Lymphocytes Absolute 1 00 0 60 - 4 47 Thousands/µL    Monocytes Absolute 0 40 0 17 - 1 22 Thousand/µL Eosinophils Absolute 0 10 0 00 - 0 61 Thousand/µL    Basophils Absolute 0 00 0 00 - 0 10 Thousands/µL   Comprehensive metabolic panel    Collection Time: 05/14/21  8:44 AM   Result Value Ref Range    Sodium 137 134 - 143 mmol/L    Potassium 3 7 3 5 - 5 5 mmol/L    Chloride 104 98 - 107 mmol/L    CO2 24 21 - 31 mmol/L    ANION GAP 9 4 - 13 mmol/L    BUN 6 (L) 7 - 25 mg/dL    Creatinine 0 76 0 70 - 1 30 mg/dL    Glucose 123 (H) 65 - 99 mg/dL    Calcium 9 0 8 6 - 10 5 mg/dL    AST 19 13 - 39 U/L    ALT 15 7 - 52 U/L    Alkaline Phosphatase 58 40 - 150 U/L    Total Protein 5 9 (L) 6 4 - 8 9 g/dL    Albumin 4 0 3 5 - 5 7 g/dL    Total Bilirubin 0 90 0 20 - 1 00 mg/dL    eGFR 113 ml/min/1 73sq m   Lipase    Collection Time: 05/14/21  8:44 AM   Result Value Ref Range    Lipase 47 11 - 82 u/L   Novel Coronavirus (Covid-19),PCR [facility] - 2 Hour Stat    Collection Time: 05/14/21 12:37 PM    Specimen: Nasopharyngeal Swab; Nares   Result Value Ref Range    SARS-CoV-2 Negative Negative     Imaging: I have personally reviewed pertinent reports  Ct Chest Abdomen Pelvis W Contrast    Result Date: 5/14/2021  Impression: Lobulated hypodensity in the left kidney measuring 1 6 x 1 0 cm does not represent simple cyst and ultrasound characterization recommended    No hydronephrosis  Sludge in the gallbladder suggested with wall thickening/ pericholecystic fluid  Ultrasound correlation as clinically indicated  Workstation performed: [id number]        EKG, Pathology, and Other Studies: I have personally reviewed pertinent reports        LUZ Lees   5/14/2021   2:16 PM

## 2021-05-15 VITALS
BODY MASS INDEX: 18.92 KG/M2 | DIASTOLIC BLOOD PRESSURE: 83 MMHG | OXYGEN SATURATION: 95 % | TEMPERATURE: 98.7 F | HEIGHT: 70 IN | WEIGHT: 132.2 LBS | HEART RATE: 57 BPM | SYSTOLIC BLOOD PRESSURE: 133 MMHG | RESPIRATION RATE: 18 BRPM

## 2021-05-15 LAB
ALBUMIN SERPL BCP-MCNC: 3.2 G/DL (ref 3.5–5.7)
ALP SERPL-CCNC: 46 U/L (ref 40–150)
ALT SERPL W P-5'-P-CCNC: 10 U/L (ref 7–52)
ANION GAP SERPL CALCULATED.3IONS-SCNC: 7 MMOL/L (ref 4–13)
AST SERPL W P-5'-P-CCNC: 12 U/L (ref 13–39)
BASOPHILS # BLD AUTO: 0 THOUSANDS/ΜL (ref 0–0.1)
BASOPHILS NFR BLD AUTO: 0 % (ref 0–2)
BILIRUB SERPL-MCNC: 1.1 MG/DL (ref 0.2–1)
BUN SERPL-MCNC: 6 MG/DL (ref 7–25)
CALCIUM ALBUM COR SERPL-MCNC: 8.8 MG/DL (ref 8.3–10.1)
CALCIUM SERPL-MCNC: 8.2 MG/DL (ref 8.6–10.5)
CHLORIDE SERPL-SCNC: 107 MMOL/L (ref 98–107)
CO2 SERPL-SCNC: 25 MMOL/L (ref 21–31)
CREAT SERPL-MCNC: 0.79 MG/DL (ref 0.7–1.3)
EOSINOPHIL # BLD AUTO: 0.1 THOUSAND/ΜL (ref 0–0.61)
EOSINOPHIL NFR BLD AUTO: 1 % (ref 0–5)
ERYTHROCYTE [DISTWIDTH] IN BLOOD BY AUTOMATED COUNT: 13.3 % (ref 11.5–14.5)
GFR SERPL CREATININE-BSD FRML MDRD: 112 ML/MIN/1.73SQ M
GLUCOSE SERPL-MCNC: 101 MG/DL (ref 65–99)
HCT VFR BLD AUTO: 41.5 % (ref 42–47)
HGB BLD-MCNC: 14.1 G/DL (ref 14–18)
LYMPHOCYTES # BLD AUTO: 1.2 THOUSANDS/ΜL (ref 0.6–4.47)
LYMPHOCYTES NFR BLD AUTO: 14 % (ref 21–51)
MCH RBC QN AUTO: 32.8 PG (ref 26–34)
MCHC RBC AUTO-ENTMCNC: 33.9 G/DL (ref 31–37)
MCV RBC AUTO: 97 FL (ref 81–99)
MONOCYTES # BLD AUTO: 0.6 THOUSAND/ΜL (ref 0.17–1.22)
MONOCYTES NFR BLD AUTO: 7 % (ref 2–12)
NEUTROPHILS # BLD AUTO: 6.5 THOUSANDS/ΜL (ref 1.4–6.5)
NEUTS SEG NFR BLD AUTO: 78 % (ref 42–75)
PLATELET # BLD AUTO: 143 THOUSANDS/UL (ref 149–390)
PMV BLD AUTO: 9 FL (ref 8.6–11.7)
POTASSIUM SERPL-SCNC: 3.9 MMOL/L (ref 3.5–5.5)
PROT SERPL-MCNC: 4.9 G/DL (ref 6.4–8.9)
RBC # BLD AUTO: 4.3 MILLION/UL (ref 4.3–5.9)
SODIUM SERPL-SCNC: 139 MMOL/L (ref 134–143)
WBC # BLD AUTO: 8.3 THOUSAND/UL (ref 4.8–10.8)

## 2021-05-15 PROCEDURE — 80053 COMPREHEN METABOLIC PANEL: CPT | Performed by: SPECIALIST

## 2021-05-15 PROCEDURE — 85025 COMPLETE CBC W/AUTO DIFF WBC: CPT | Performed by: SPECIALIST

## 2021-05-15 RX ADMIN — PIPERACILLIN SODIUM AND TAZOBACTAM SODIUM 3.38 G: 3; .375 INJECTION, POWDER, LYOPHILIZED, FOR SOLUTION INTRAVENOUS at 04:52

## 2021-05-15 RX ADMIN — DEXTROSE, SODIUM CHLORIDE, AND POTASSIUM CHLORIDE 125 ML/HR: 5; .45; .15 INJECTION INTRAVENOUS at 05:28

## 2021-05-15 NOTE — QUICK NOTE
The patient signed out AMA before I had the opportunity to get his consent for surgery  We had discussed Outpatient surgery on Wednesday May 19, 2021  The patient had been instructed by the nurse to call for an appointment on Monday, and to return to the ER for any recurrent symptoms of acute cholecystitis

## 2021-05-15 NOTE — CASE MANAGEMENT
CM attempted to meet with pt at bedside to discuss discharge planning  Pt appeared very anxious and stated he was leaving AMA and did not want to engage in CM interview  Pt stated he will call someone for a ride  CM ended interaction

## 2021-05-15 NOTE — UTILIZATION REVIEW
Initial Clinical Review    Admission: Date/Time/Statement:   Admission Orders (From admission, onward)     Ordered        05/14/21 1349  Place in Observation  Once                   Orders Placed This Encounter   Procedures    Place in Observation     Standing Status:   Standing     Number of Occurrences:   1     Order Specific Question:   Level of Care     Answer:   Med Surg [16]     ED Arrival Information     Expected Arrival Acuity Means of Arrival Escorted By Service Admission Type    - 5/14/2021 08:22 Urgent Walk-In Family Member Surgery-General Urgent    Arrival Complaint    vomiting / abdominal pain        Chief Complaint   Patient presents with    Vomiting     ate at a [employer] last night  has been vomiting since   Chills       Initial Presentation: 38year old male, presented to the ED @ [address], from home via walk in  Admitted as Observation due to Acute Abd pain R/O Acute Cholecystitis  Date: 05/14/2021   Around 0100 last night after eating a meal, developed epigastric RUQ abd pain  Began after an episode of vomiting  Clear liquid diet  IV flds  IV abx  Analgesia and Antiemetics PRN  DVT ppx  Serial  abd exams  Follw up AM labs  US of Gall Bladder for further evaluation of possible acute cholecystitis  Follow up URO recommended for US of Kidney    Day 2: 05/15/2021    Left AMA    ED Triage Vitals [05/14/21 0833]   Temperature Pulse Respirations Blood Pressure SpO2   97 8 °F (36 6 °C) 64 20 141/84 100 %      Temp Source Heart Rate Source Patient Position - Orthostatic VS BP Location FiO2 (%)   Temporal Monitor Sitting Left arm --      Pain Score       5          Wt Readings from Last 1 Encounters:   05/14/21 60 kg (132 lb 3 2 oz)     Additional Vital Signs:   Date/Time  Temp  Pulse  Resp  BP  MAP (mmHg)  SpO2  O2 Device  Patient Position - Orthostatic VS   05/15/21 0700  98 7 °F (37 1 °C)  57  18  133/83  --  95 %  None (Room air)  Lying   05/14/21 2225  98 1 °F (36 7 °C)  67  16  106/55  78  95 %  None (Room air)  Lying   05/14/21 1446  98 1 °F (36 7 °C)  69  18  129/84  --  99 %  None (Room air)  Lying   05/14/21 1130  --  64  --  125/85  102  98 %  --  --   05/14/21 1115  --  56  --  134/85  106  97 %  --  --     05/14/2021 @ 1438  US gall bladder:  Findings concerning for acute calculous cholecystitis  05/14/2021 @ 111  CT chest/abd/pel:  Lobulated hypodensity in the left kidney measuring 1 6 x 1 0 cm does not represent simple cyst and ultrasound characterization recommended    No hydronephrosis  Sludge in the gallbladder suggested with wall thickening/ pericholecystic fluid   Ultrasound correlation as clinically indicated       Pertinent Labs/Diagnostic Test Results:   Results from last 7 days   Lab Units 05/14/21  1237   SARS-COV-2  Negative     Results from last 7 days   Lab Units 05/15/21  0452 05/14/21  0844   WBC Thousand/uL 8 30 8 60   HEMOGLOBIN g/dL 14 1 15 0   HEMATOCRIT % 41 5* 44 2   PLATELETS Thousands/uL 143* 148*   NEUTROS ABS Thousands/µL 6 50 7 00*     Results from last 7 days   Lab Units 05/15/21  0452 05/14/21  0844   SODIUM mmol/L 139 137   POTASSIUM mmol/L 3 9 3 7   CHLORIDE mmol/L 107 104   CO2 mmol/L 25 24   ANION GAP mmol/L 7 9   BUN mg/dL 6* 6*   CREATININE mg/dL 0 79 0 76   EGFR ml/min/1 73sq m 112 113   CALCIUM mg/dL 8 2* 9 0     Results from last 7 days   Lab Units 05/15/21  0452 05/14/21  0844   AST U/L 12* 19   ALT U/L 10 15   ALK PHOS U/L 46 58   TOTAL PROTEIN g/dL 4 9* 5 9*   ALBUMIN g/dL 3 2* 4 0   TOTAL BILIRUBIN mg/dL 1 10* 0 90     Results from last 7 days   Lab Units 05/15/21  0452 05/14/21  0844   GLUCOSE RANDOM mg/dL 101* 123*     Results from last 7 days   Lab Units 05/14/21  0844   LIPASE u/L 47     ED Treatment:   Medication Administration from 05/14/2021 0822 to 05/14/2021 1445       Date/Time Order Dose Route Action     05/14/2021 1105 sodium chloride 0 9 % bolus 1,000 mL 0 mL Intravenous Stopped     05/14/2021 0902 sodium chloride 0 9 % bolus 1,000 mL 1,000 mL Intravenous New Bag     05/14/2021 0903 ondansetron (ZOFRAN) injection 4 mg 4 mg Intravenous Given     05/14/2021 0912 iohexol (OMNIPAQUE) 240 MG/ML solution 50 mL 50 mL Oral Given     05/14/2021 1103 iohexol (OMNIPAQUE) 350 MG/ML injection (MULTI-DOSE) 100 mL 100 mL Intravenous Given     05/14/2021 1227 morphine injection 2 mg 2 mg Intravenous Given        Past Medical History:   Diagnosis Date    Anxiety     Bipolar disorder ([facility] [address] )      Present on Admission:   Acute cholecystitis due to biliary calculus      Admitting Diagnosis: Cholecystitis [K81 9]  Vomiting [R11 10]  Abdominal pain [R10 9]  Left renal mass [N28 89]  Age/Sex: 38 y o  male  Admission Orders:  Scheduled Medications:  enoxaparin, 40 mg, Subcutaneous, Daily  mirtazapine, 15 mg, Oral, HS  nicotine, 1 patch, Transdermal, Daily  piperacillin-tazobactam, 3 375 g, Intravenous, Q6H  QUEtiapine, 50 mg, Oral, HS      Continuous IV Infusions:  dextrose 5 % and sodium chloride 0 45 % with KCl 20 mEq/L, 125 mL/hr, Intravenous, Continuous      PRN Meds:  LORazepam, 1 mg, Intravenous, Q6H PRN  morphine injection, 2 mg, Intravenous, Q2H PRN  ondansetron, 4 mg, Intravenous, Q6H PRN  pneumococcal 23-valent polysaccharide vaccine, 0 5 mL, Subcutaneous, Prior to discharge      Clear liquid diet        Network Utilization Review Department  ATTENTION: Please call with any questions or concerns to [phone] and carefully listen to the prompts so that you are directed to the right person  All voicemails are confidential   Lynette Kussmaul all requests for admission clinical reviews, approved or denied determinations and any other requests to dedicated fax number below belonging to the campus where the patient is receiving treatment   List of dedicated fax numbers for the Facilities:  [address] DENIALS (Administrative/Medical Necessity) [phone]   [address] (Maternity/NICU/Pediatrics) [phone] [address] [place] [address] [address] [place] [address] [address] [facility] [address] [address] [place] [address] [phone] [place] [address] [phone]

## 2021-05-17 ENCOUNTER — TELEPHONE (OUTPATIENT)
Dept: SURGERY | Facility: CLINIC | Age: 41
End: 2021-05-17

## 2021-05-17 NOTE — TELEPHONE ENCOUNTER
Called patient at the request of Dr Edd Bull to schedule him for gallbladder surgery on 05/24/2021  Patient did not want to schedule at this time nor did he want to come to the office  He states he is waiting for a call back from his Psych doctor, he will speak to her regarding what he should do about having this surgery  If she is agreeable then he will give our office a call  I did let the patient know that if we can't get his surgery scheduled for 05/24 then the next available date would be 06/11/2021

## 2021-05-18 ENCOUNTER — PROCEDURE VISIT (OUTPATIENT)
Dept: SURGERY | Facility: CLINIC | Age: 41
End: 2021-05-18
Payer: COMMERCIAL

## 2021-05-18 VITALS
SYSTOLIC BLOOD PRESSURE: 126 MMHG | HEIGHT: 70 IN | WEIGHT: 128 LBS | TEMPERATURE: 97.8 F | DIASTOLIC BLOOD PRESSURE: 68 MMHG | HEART RATE: 87 BPM | BODY MASS INDEX: 18.32 KG/M2 | RESPIRATION RATE: 18 BRPM

## 2021-05-18 DIAGNOSIS — K80.10 CALCULUS OF GALLBLADDER WITH CHRONIC CHOLECYSTITIS WITHOUT OBSTRUCTION: Primary | ICD-10-CM

## 2021-05-18 PROCEDURE — 99212 OFFICE O/P EST SF 10 MIN: CPT | Performed by: SPECIALIST

## 2021-05-18 RX ORDER — CEFAZOLIN SODIUM 1 G/50ML
1000 SOLUTION INTRAVENOUS ONCE
Status: CANCELLED | OUTPATIENT
Start: 2021-05-24 | End: 2021-05-18

## 2021-05-18 NOTE — PROGRESS NOTES
Assessment/Plan:    Acute cholecystitis due to biliary calculus   The patient was admitted from the emergency room on Friday May 14th, but signed out against medical advice before surgery could be scheduled  His ultrasound strongly suggested acute calculous cholecystitis, and he has a strong family history  Since leaving the hospital the patient is been more less pain free, and undoubtedly he had more of a biliary colic situation then acute cholecystitis  He now presents to schedule surgery as an outpatient for Monday the 24th of May  Diagnoses and all orders for this visit:    Calculus of gallbladder with chronic cholecystitis without obstruction  -     Case request operating room: CHOLECYSTECTOMY LAPAROSCOPIC; Standing  -     Case request operating room: CHOLECYSTECTOMY LAPAROSCOPIC    Other orders  -     Diet NPO; Sips with meds; Standing  -     Apply Sequential Compression Device; Standing  -     Place sequential compression device; Standing  -     ceFAZolin (ANCEF) 1,000 mg in dextrose 5 % 100 mL IVPB          Subjective:      Patient ID: Randa Mijares is a 38 y o  male  The patient is a 41-year-old white male with intermittent epigastric and right upper quadrant pain, diagnosed with calculous cholecystitis  The following portions of the patient's history were reviewed and updated as appropriate: allergies, current medications, past family history, past medical history, past social history, past surgical history and problem list     Review of Systems   Constitutional: Negative for chills and fever  HENT: Negative for trouble swallowing  Respiratory: Negative  Cardiovascular: Negative  Gastrointestinal: Positive for abdominal pain, nausea and vomiting  Endocrine: Negative  Genitourinary: Negative  Musculoskeletal: Negative  Skin: Negative for color change  Neurological: Negative  Hematological: Negative      Psychiatric/Behavioral:        Hx of Drug use and incarceration         Objective:      /68 (BP Location: Left arm, Patient Position: Sitting, Cuff Size: Standard)   Pulse 87   Temp 97 8 °F (36 6 °C) (Temporal)   Resp 18   Ht 5' 10" (1 778 m)   Wt 58 1 kg (128 lb)   BMI 18 37 kg/m²          Physical Exam  Constitutional:       General: He is not in acute distress  Appearance: Normal appearance  HENT:      Head: Normocephalic and atraumatic  Eyes:      General: No scleral icterus  Neck:      Musculoskeletal: Neck supple  Cardiovascular:      Rate and Rhythm: Normal rate and regular rhythm  Pulmonary:      Effort: Pulmonary effort is normal       Breath sounds: Normal breath sounds  Abdominal:      General: Abdomen is flat  Palpations: Abdomen is soft  There is no mass  Tenderness: There is no abdominal tenderness  There is no guarding  Genitourinary:     Comments: Deferred  Musculoskeletal:         General: No swelling  Skin:     General: Skin is warm and dry  Comments: Extensive tattoos noted   Neurological:      General: No focal deficit present  Mental Status: He is alert and oriented to person, place, and time     Psychiatric:         Behavior: Behavior normal

## 2021-05-18 NOTE — H&P (VIEW-ONLY)
Assessment/Plan:    Acute cholecystitis due to biliary calculus   The patient was admitted from the emergency room on Friday May 14th, but signed out against medical advice before surgery could be scheduled  His ultrasound strongly suggested acute calculous cholecystitis, and he has a strong family history  Since leaving the hospital the patient is been more less pain free, and undoubtedly he had more of a biliary colic situation then acute cholecystitis  He now presents to schedule surgery as an outpatient for Monday the 24th of May  Diagnoses and all orders for this visit:    Calculus of gallbladder with chronic cholecystitis without obstruction  -     Case request operating room: CHOLECYSTECTOMY LAPAROSCOPIC; Standing  -     Case request operating room: CHOLECYSTECTOMY LAPAROSCOPIC    Other orders  -     Diet NPO; Sips with meds; Standing  -     Apply Sequential Compression Device; Standing  -     Place sequential compression device; Standing  -     ceFAZolin (ANCEF) 1,000 mg in dextrose 5 % 100 mL IVPB          Subjective:      Patient ID: Brock Yeboah is a 38 y o  male  The patient is a 40-year-old white male with intermittent epigastric and right upper quadrant pain, diagnosed with calculous cholecystitis  The following portions of the patient's history were reviewed and updated as appropriate: allergies, current medications, past family history, past medical history, past social history, past surgical history and problem list     Review of Systems   Constitutional: Negative for chills and fever  HENT: Negative for trouble swallowing  Respiratory: Negative  Cardiovascular: Negative  Gastrointestinal: Positive for abdominal pain, nausea and vomiting  Endocrine: Negative  Genitourinary: Negative  Musculoskeletal: Negative  Skin: Negative for color change  Neurological: Negative  Hematological: Negative      Psychiatric/Behavioral:        Hx of Drug use and incarceration         Objective:      /68 (BP Location: Left arm, Patient Position: Sitting, Cuff Size: Standard)   Pulse 87   Temp 97 8 °F (36 6 °C) (Temporal)   Resp 18   Ht 5' 10" (1 778 m)   Wt 58 1 kg (128 lb)   BMI 18 37 kg/m²          Physical Exam  Constitutional:       General: He is not in acute distress  Appearance: Normal appearance  HENT:      Head: Normocephalic and atraumatic  Eyes:      General: No scleral icterus  Neck:      Musculoskeletal: Neck supple  Cardiovascular:      Rate and Rhythm: Normal rate and regular rhythm  Pulmonary:      Effort: Pulmonary effort is normal       Breath sounds: Normal breath sounds  Abdominal:      General: Abdomen is flat  Palpations: Abdomen is soft  There is no mass  Tenderness: There is no abdominal tenderness  There is no guarding  Genitourinary:     Comments: Deferred  Musculoskeletal:         General: No swelling  Skin:     General: Skin is warm and dry  Comments: Extensive tattoos noted   Neurological:      General: No focal deficit present  Mental Status: He is alert and oriented to person, place, and time     Psychiatric:         Behavior: Behavior normal

## 2021-05-18 NOTE — ASSESSMENT & PLAN NOTE
The patient was admitted from the emergency room on Friday May 14th, but signed out against medical advice before surgery could be scheduled  His ultrasound strongly suggested acute calculous cholecystitis, and he has a strong family history  Since leaving the hospital the patient is been more less pain free, and undoubtedly he had more of a biliary colic situation then acute cholecystitis  He now presents to schedule surgery as an outpatient for Monday the 24th of May

## 2021-05-18 NOTE — PATIENT INSTRUCTIONS
You are scheduled for surgery Monday May 24, 2021  The hospital will call the Friday prior to surgery and tell you when to arrive  You need to shower with Hibiclens the night before and the morning of surgery  If you have intractable pain, nausea or vomiting, then you need to go back to the ER and perhaps be admitted  Pre-Surgery Instructions:   Medication Instructions    mirtazapine (REMERON) 15 mg tablet per anesthesia guidelines     patient supplied medication per anesthesia guidelines     QUEtiapine (SEROquel) 50 mg tablet per anesthesia guidelines      Biliary Colic   GENERAL INFORMATION:   What is biliary colic? Biliary colic is severe pain in your upper abdomen caused by a gallbladder problem  Your gallbladder stores bile, which helps break down the fats that you eat  What causes biliary colic? Biliary colic happens when something blocks the duct that moves bile out of the gallbladder  Any of the following can cause blockage:  · Gallstones    · Swelling of the gallbladder    · Narrow bile duct    · Injury    · Pancreatitis (inflammation of pancreas)    · Duodenitis (inflammation of small intestine)    · Spasms in the esophagus  What increases my risk for biliary colic? · Family history of biliary colic    · Certain medicines, such as birth control    · Older age    · Pregnancy    · Obesity    · Medical conditions, such as liver cirrhosis or hemolytic disease  What are the signs and symptoms of biliary colic? · Pain in the middle of your upper abdomen, just below your breast bone    · Pain that is worse on your right side, just below your ribs    · Pain in your back, just under your shoulder blade    · Nausea and vomiting    · Pain after you eat a meal  How is biliary colic diagnosed? · Blood tests check for what is causing your biliary colic  · Urine tests check gallbladder function  · An x-ray of your abdomen may help determine what is causing your biliary colic      · An ultrasound uses sound waves to show pictures on a monitor  An ultrasound may be done to show blockages or other causes of your pain  · A CT scan , or CAT scan, is a type of x-ray that uses a computer takes pictures of your abdomen  The pictures may show a blockage or other causes of your pain  You may be given a dye before the pictures are taken to help healthcare providers see the pictures better  Tell the healthcare provider if you have ever had an allergic reaction to contrast dye  · An MRI is a scan that uses powerful magnets and a computer to take pictures of your biliary system  An MRI may show blockages or growths  You may be given dye to help the pictures show up better  Tell the healthcare provider if you have ever had an allergic reaction to contrast dye  Do not enter the MRI room with anything metal  Metal can cause serious injury  Tell the healthcare provider if you have any metal in or on your body  How is biliary colic treated? · Medicines can help decrease pain and muscle spasms  You may also need medicine to calm your stomach and stop vomiting  · Procedures may be needed to remove blockages or widen your bile duct  Ask your healthcare provider for more information about any procedures you may need  How can I manage my symptoms? · Avoid alcohol  Alcohol can damage your gallbladder and make your symptoms worse  · Maintain a healthy weight  Ask your healthcare provider how much you should weigh  Ask him to help you create a weight loss plan if you are overweight  · Eat a variety of healthy foods  Healthy foods include fruits, vegetables, whole-grain breads, low-fat dairy products, beans, lean meats, and fish  Foods that are high in fiber and low in fat and cholesterol may decrease your symptoms  Ask if you need to be on a special diet  · Exercise  Ask your healthcare provider about the best exercise plan for you  Exercise may help improve your symptoms    When should I contact my healthcare provider? · You have a fever  · Your pain gets worse, even after you take medicine  · You have nausea or are vomiting  · Your skin or eyes are yellow  · You have questions or concerns about your condition or care  When should I seek immediate care or call 911? · You have severe pain  · You feel like you are going to faint  · You are short of breath  CARE AGREEMENT:   You have the right to help plan your care  Learn about your health condition and how it may be treated  Discuss treatment options with your caregivers to decide what care you want to receive  You always have the right to refuse treatment  The above information is an  only  It is not intended as medical advice for individual conditions or treatments  Talk to your doctor, nurse or pharmacist before following any medical regimen to see if it is safe and effective for you  © 2014 [address] is for End User's use only and may not be sold, redistributed or otherwise used for commercial purposes  All illustrations and images included in CareNotes® are the copyrighted property of A D [employer] M [employer]  or Shreyas Driver

## 2021-05-23 ENCOUNTER — ANESTHESIA EVENT (OUTPATIENT)
Dept: PERIOP | Facility: HOSPITAL | Age: 41
End: 2021-05-23
Payer: COMMERCIAL

## 2021-05-24 ENCOUNTER — ANESTHESIA (OUTPATIENT)
Dept: PERIOP | Facility: HOSPITAL | Age: 41
End: 2021-05-24
Payer: COMMERCIAL

## 2021-05-24 ENCOUNTER — HOSPITAL ENCOUNTER (OUTPATIENT)
Facility: HOSPITAL | Age: 41
Setting detail: OUTPATIENT SURGERY
Discharge: HOME | End: 2021-05-24
Attending: SPECIALIST | Admitting: SPECIALIST
Payer: COMMERCIAL

## 2021-05-24 ENCOUNTER — TELEPHONE (OUTPATIENT)
Dept: SURGERY | Facility: CLINIC | Age: 41
End: 2021-05-24

## 2021-05-24 VITALS
HEIGHT: 70 IN | TEMPERATURE: 97.5 F | DIASTOLIC BLOOD PRESSURE: 81 MMHG | BODY MASS INDEX: 17.9 KG/M2 | HEART RATE: 72 BPM | OXYGEN SATURATION: 98 % | WEIGHT: 125 LBS | RESPIRATION RATE: 16 BRPM | SYSTOLIC BLOOD PRESSURE: 127 MMHG

## 2021-05-24 DIAGNOSIS — K80.00 ACUTE CHOLECYSTITIS DUE TO BILIARY CALCULUS: Primary | ICD-10-CM

## 2021-05-24 DIAGNOSIS — K80.10 CALCULUS OF GALLBLADDER WITH CHRONIC CHOLECYSTITIS WITHOUT OBSTRUCTION: ICD-10-CM

## 2021-05-24 DIAGNOSIS — Z90.49 STATUS POST LAPAROSCOPIC CHOLECYSTECTOMY: ICD-10-CM

## 2021-05-24 PROBLEM — F43.20 ADJUSTMENT DISORDER: Status: ACTIVE | Noted: 2020-08-12

## 2021-05-24 PROBLEM — Z72.811 ADULT ANTISOCIAL BEHAVIOR: Status: ACTIVE | Noted: 2020-08-12

## 2021-05-24 PROBLEM — F43.10 POSTTRAUMATIC STRESS DISORDER: Status: ACTIVE | Noted: 2020-08-12

## 2021-05-24 PROBLEM — F31.9 BIPOLAR I DISORDER (HCC): Status: ACTIVE | Noted: 2020-08-12

## 2021-05-24 PROBLEM — F41.9 ANXIETY: Status: ACTIVE | Noted: 2020-08-12

## 2021-05-24 PROCEDURE — 47562 LAPAROSCOPIC CHOLECYSTECTOMY: CPT | Performed by: SPECIALIST

## 2021-05-24 PROCEDURE — NC001 PR NO CHARGE: Performed by: PHYSICIAN ASSISTANT

## 2021-05-24 PROCEDURE — 88304 TISSUE EXAM BY PATHOLOGIST: CPT | Performed by: PATHOLOGY

## 2021-05-24 PROCEDURE — 47562 LAPAROSCOPIC CHOLECYSTECTOMY: CPT | Performed by: PHYSICIAN ASSISTANT

## 2021-05-24 RX ORDER — GLYCOPYRROLATE 0.2 MG/ML
INJECTION INTRAMUSCULAR; INTRAVENOUS AS NEEDED
Status: DISCONTINUED | OUTPATIENT
Start: 2021-05-24 | End: 2021-05-24

## 2021-05-24 RX ORDER — DEXAMETHASONE SODIUM PHOSPHATE 10 MG/ML
INJECTION, SOLUTION INTRAMUSCULAR; INTRAVENOUS AS NEEDED
Status: DISCONTINUED | OUTPATIENT
Start: 2021-05-24 | End: 2021-05-24

## 2021-05-24 RX ORDER — NEOSTIGMINE METHYLSULFATE 1 MG/ML
INJECTION INTRAVENOUS AS NEEDED
Status: DISCONTINUED | OUTPATIENT
Start: 2021-05-24 | End: 2021-05-24

## 2021-05-24 RX ORDER — ONDANSETRON 2 MG/ML
INJECTION INTRAMUSCULAR; INTRAVENOUS AS NEEDED
Status: DISCONTINUED | OUTPATIENT
Start: 2021-05-24 | End: 2021-05-24

## 2021-05-24 RX ORDER — BUPIVACAINE HYDROCHLORIDE AND EPINEPHRINE 2.5; 5 MG/ML; UG/ML
INJECTION, SOLUTION EPIDURAL; INFILTRATION; INTRACAUDAL; PERINEURAL AS NEEDED
Status: DISCONTINUED | OUTPATIENT
Start: 2021-05-24 | End: 2021-05-24 | Stop reason: HOSPADM

## 2021-05-24 RX ORDER — FENTANYL CITRATE 50 UG/ML
INJECTION, SOLUTION INTRAMUSCULAR; INTRAVENOUS AS NEEDED
Status: DISCONTINUED | OUTPATIENT
Start: 2021-05-24 | End: 2021-05-24

## 2021-05-24 RX ORDER — CEFAZOLIN SODIUM 1 G/3ML
INJECTION, POWDER, FOR SOLUTION INTRAMUSCULAR; INTRAVENOUS AS NEEDED
Status: DISCONTINUED | OUTPATIENT
Start: 2021-05-24 | End: 2021-05-24

## 2021-05-24 RX ORDER — OXYCODONE HYDROCHLORIDE AND ACETAMINOPHEN 5; 325 MG/1; MG/1
1 TABLET ORAL EVERY 4 HOURS PRN
Qty: 8 TABLET | Refills: 0 | Status: ON HOLD | OUTPATIENT
Start: 2021-05-24 | End: 2021-08-04 | Stop reason: ALTCHOICE

## 2021-05-24 RX ORDER — ROCURONIUM BROMIDE 10 MG/ML
INJECTION, SOLUTION INTRAVENOUS AS NEEDED
Status: DISCONTINUED | OUTPATIENT
Start: 2021-05-24 | End: 2021-05-24

## 2021-05-24 RX ORDER — MIDAZOLAM HYDROCHLORIDE 2 MG/2ML
INJECTION, SOLUTION INTRAMUSCULAR; INTRAVENOUS AS NEEDED
Status: DISCONTINUED | OUTPATIENT
Start: 2021-05-24 | End: 2021-05-24

## 2021-05-24 RX ORDER — ONDANSETRON 2 MG/ML
4 INJECTION INTRAMUSCULAR; INTRAVENOUS ONCE AS NEEDED
Status: DISCONTINUED | OUTPATIENT
Start: 2021-05-24 | End: 2021-05-24 | Stop reason: HOSPADM

## 2021-05-24 RX ORDER — CEFAZOLIN SODIUM 1 G/50ML
1000 SOLUTION INTRAVENOUS ONCE
Status: COMPLETED | OUTPATIENT
Start: 2021-05-24 | End: 2021-05-24

## 2021-05-24 RX ORDER — SODIUM CHLORIDE 9 MG/ML
INJECTION, SOLUTION INTRAVENOUS AS NEEDED
Status: DISCONTINUED | OUTPATIENT
Start: 2021-05-24 | End: 2021-05-24 | Stop reason: HOSPADM

## 2021-05-24 RX ORDER — LABETALOL 20 MG/4 ML (5 MG/ML) INTRAVENOUS SYRINGE
AS NEEDED
Status: DISCONTINUED | OUTPATIENT
Start: 2021-05-24 | End: 2021-05-24

## 2021-05-24 RX ORDER — KETOROLAC TROMETHAMINE 30 MG/ML
INJECTION, SOLUTION INTRAMUSCULAR; INTRAVENOUS AS NEEDED
Status: DISCONTINUED | OUTPATIENT
Start: 2021-05-24 | End: 2021-05-24

## 2021-05-24 RX ORDER — PROPOFOL 10 MG/ML
INJECTION, EMULSION INTRAVENOUS AS NEEDED
Status: DISCONTINUED | OUTPATIENT
Start: 2021-05-24 | End: 2021-05-24

## 2021-05-24 RX ORDER — HYDROMORPHONE HCL/PF 1 MG/ML
0.5 SYRINGE (ML) INJECTION
Status: DISCONTINUED | OUTPATIENT
Start: 2021-05-24 | End: 2021-05-24 | Stop reason: HOSPADM

## 2021-05-24 RX ORDER — LIDOCAINE HYDROCHLORIDE 10 MG/ML
INJECTION, SOLUTION EPIDURAL; INFILTRATION; INTRACAUDAL; PERINEURAL AS NEEDED
Status: DISCONTINUED | OUTPATIENT
Start: 2021-05-24 | End: 2021-05-24

## 2021-05-24 RX ORDER — SODIUM CHLORIDE, SODIUM LACTATE, POTASSIUM CHLORIDE, CALCIUM CHLORIDE 600; 310; 30; 20 MG/100ML; MG/100ML; MG/100ML; MG/100ML
75 INJECTION, SOLUTION INTRAVENOUS CONTINUOUS
Status: DISCONTINUED | OUTPATIENT
Start: 2021-05-24 | End: 2021-05-24 | Stop reason: HOSPADM

## 2021-05-24 RX ADMIN — FENTANYL CITRATE 25 MCG: 50 INJECTION INTRAMUSCULAR; INTRAVENOUS at 09:29

## 2021-05-24 RX ADMIN — PROPOFOL 100 MG: 10 INJECTION, EMULSION INTRAVENOUS at 08:46

## 2021-05-24 RX ADMIN — LABETALOL 20 MG/4 ML (5 MG/ML) INTRAVENOUS SYRINGE 5 MG: at 09:26

## 2021-05-24 RX ADMIN — DEXAMETHASONE SODIUM PHOSPHATE 4 MG: 10 INJECTION, SOLUTION INTRAMUSCULAR; INTRAVENOUS at 09:22

## 2021-05-24 RX ADMIN — CEFAZOLIN 1000 MG: 1 INJECTION, POWDER, FOR SOLUTION INTRAMUSCULAR; INTRAVENOUS at 08:57

## 2021-05-24 RX ADMIN — FENTANYL CITRATE 25 MCG: 50 INJECTION INTRAMUSCULAR; INTRAVENOUS at 09:20

## 2021-05-24 RX ADMIN — KETOROLAC TROMETHAMINE 30 MG: 30 INJECTION, SOLUTION INTRAMUSCULAR; INTRAVENOUS at 09:45

## 2021-05-24 RX ADMIN — ROCURONIUM BROMIDE 50 MG: 10 INJECTION INTRAVENOUS at 08:47

## 2021-05-24 RX ADMIN — LIDOCAINE HYDROCHLORIDE 50 MG: 10 INJECTION, SOLUTION EPIDURAL; INFILTRATION; INTRACAUDAL; PERINEURAL at 08:46

## 2021-05-24 RX ADMIN — SODIUM CHLORIDE, SODIUM LACTATE, POTASSIUM CHLORIDE, AND CALCIUM CHLORIDE 75 ML/HR: .6; .31; .03; .02 INJECTION, SOLUTION INTRAVENOUS at 07:14

## 2021-05-24 RX ADMIN — FENTANYL CITRATE 25 MCG: 50 INJECTION INTRAMUSCULAR; INTRAVENOUS at 09:12

## 2021-05-24 RX ADMIN — PROPOFOL 200 MG: 10 INJECTION, EMULSION INTRAVENOUS at 08:47

## 2021-05-24 RX ADMIN — PHENYLEPHRINE HYDROCHLORIDE 100 MCG: 10 INJECTION INTRAVENOUS at 09:01

## 2021-05-24 RX ADMIN — MIDAZOLAM HYDROCHLORIDE 2 MG: 1 INJECTION, SOLUTION INTRAMUSCULAR; INTRAVENOUS at 08:37

## 2021-05-24 RX ADMIN — CEFAZOLIN SODIUM 1000 MG: 1 SOLUTION INTRAVENOUS at 08:36

## 2021-05-24 RX ADMIN — SODIUM CHLORIDE, SODIUM LACTATE, POTASSIUM CHLORIDE, AND CALCIUM CHLORIDE: .6; .31; .03; .02 INJECTION, SOLUTION INTRAVENOUS at 09:35

## 2021-05-24 RX ADMIN — GLYCOPYRROLATE 0.4 MG: 0.2 INJECTION, SOLUTION INTRAMUSCULAR; INTRAVENOUS at 10:00

## 2021-05-24 RX ADMIN — ONDANSETRON 4 MG: 2 INJECTION INTRAMUSCULAR; INTRAVENOUS at 09:44

## 2021-05-24 RX ADMIN — PHENYLEPHRINE HYDROCHLORIDE 100 MCG: 10 INJECTION INTRAVENOUS at 09:08

## 2021-05-24 RX ADMIN — NEOSTIGMINE METHYLSULFATE 3 MG: 1 INJECTION, SOLUTION INTRAVENOUS at 10:00

## 2021-05-24 RX ADMIN — FENTANYL CITRATE 25 MCG: 50 INJECTION INTRAMUSCULAR; INTRAVENOUS at 10:00

## 2021-05-24 NOTE — INTERVAL H&P NOTE
H&P reviewed  After examining the patient I find no changes in the patients condition since the H&P had been written      Vitals:    05/24/21 0706   BP: 110/70   Pulse: 71   Resp: 18   Temp: 97 5 °F (36 4 °C)   SpO2: 97%

## 2021-05-24 NOTE — DISCHARGE INSTRUCTIONS
DISCHARGE INSTRUCTIONS:  FOLLOW UP APPOINTMENT: Following discharge from the hospital call the office in 1-2 days to set up a post operative appointment to be seen in 2 weeks by Dr Cassie Carty: Following discharge from the hospital, you may have some questions about your procedure, your activities or your general condition  These instructions may answer some of your questions and help you adjust during the first few days following your operation  You can expect to be sore and tender mostly around the incisions  This pain should last approximally 5 days and gradually improve daily  INCISION SITES:  - You may apply ice to the incisions to help with pain  Avoid heat as this may make skin glue tacky  - It is normal to have some bruising, swelling or mild discoloration around the incision  If increasing redness or pain develops, call our office immediately    -Do not apply any creams, lotions, or ointments  If you have dressings:  - You may remove the gauze dressing from your incisions 48 hours after surgery  Underneath this dressing is a tape like dressing called Steri Strips  Leave the steri strips in place for 5-7 days  They may fall off on their own, this is ok  If you have surgical adhesive glue:  - The incisions are closed with dissolvable sutures underneath the skin and a surgical adhesive glue overlying the skin  - Do not pick at the adhesive  It will naturally wear off with time  WOUND CARE:  -Leave steri-strips on, allow to fall off naturally  Avoid tight adhering, irritative clothing   -You may gently shower, let water and soap run down and pat dry; no scrubbing the incision sites    -No baths, hot tubs, or swimming x 6 weeks or until cleared after follow up appointment  PAIN CONTROL/MEDICATIONS:  -Recommend taking over the counter pain medication such as Tylenol, Aleve OR Ibuprofen first; read and follow labels   You may alternate Tylenol and Ibuprofen every 3 hours   -Only use prescribed pain medications as needed and try to taper down use overtime  Do not drive or operate heavy machinery while on prescription pain medications  Do not take with alcohol   - If you were given a prescription for Percocet, Norco, or Vicodin for pain be sure to eat prior to taking as these medications as they may cause nausea and vomiting on an empty stomach  -DO NOT take Tylenol  (acetaminophen) with prescribed pain medication for a fever or for further pain control as these medications already contain Tylenol in them  Take one or the other  Do not exceed more than 4000 mg of acetaminophen in 24 hours or 3000 mg if you have liver disease    -You may apply ice at the incision site as needed for 20-30 minutes on/off to decrease pain, swelling the first few days  - If you were given an antibiotic take it until it is finished  DIET/LIFE HABITS:  -Advance diet as tolerated  Start with bland foods  Once tolerating normal diet, include fiber-rich foods such as fruits and vegetables to help with bowel movements   -Stay hydrated  Drink plenty of non-caffienated, non-alcoholic beverages such as water, juices, popsicles, etc   -Stay away from alcohol as this can interrupt wound healing and increase chance of unwanted bleeding    -No smoking at least 2 weeks post surgery, this can delay wound healing  Smoking cessation is encouraged and we can provide you with options to facilitate cessation   -If you are having constipation, ensure fiber intake, stay active, and if requiring more, you may take over the counter stool softners as needed  ACTIVITY/RESTRICTIONS:  -No strenuous exercise and no heavy lifting, pulling, or pushing >10-15 lbs x 4 weeks or until cleared by surgeon   -The evening following the procedure you should rest as much as possible, sitting, lying or reclining  You should be sure someone remains with you until the next morning  Gradually increase your activity daily   Walking 3-4 times daily is good and stairs are ok  Listen to your body  If you start to get tired or sore then rest    -No driving for 5 days or while taking narcotics for pain  RETURN TO WORK:  -You may return to work or other activities as soon as your pain is controlled and you feel comfortable  For many people, this is 5 to 7 days after surgery  If your job requires heavy lifting you will need to be on light duty for 2-3 weeks       CALL THE OFFICE IF/RETURN TO ED:  -Fevers/chills with uncontrolled temperature > 100 4 F   -Increasing severe pain uncontrolled with pain medicine   -Incision site is having thick, yellow drainage, increasing redness, is warm to the touch, or becoming increasingly tender   -Any changes in overall ana such as: nausea/vomitting, fevers/chills, diarrhea/constipation, inability to urinate, chest pains, palpations, trouble breathing, coughing, etc

## 2021-05-24 NOTE — OP NOTE
OPERATIVE REPORT  PATIENT NAME: Brock Yeboah    :  1980  MRN: [id number]  Pt Location: [address] [place] [address] OR ROOM 02    SURGERY DATE: 2021    Surgeon(s) and Role:     * Myriam Roche MD - Primary     * Mara Angeles PA-C - Assisting    Preop Diagnosis:  Calculus of gallbladder with chronic cholecystitis without obstruction [K80 10]    Post-Op Diagnosis Codes:     * Calculus of gallbladder with chronic cholecystitis without obstruction [K80 10]    Procedure(s) (LRB):  CHOLECYSTECTOMY LAPAROSCOPIC (N/A)    Specimen(s):  ID Type Source Tests Collected by Time Destination   1 : Gallbladder Tissue Gallbladder TISSUE EXAM Myriam Roche MD 2021 [id number]        Estimated Blood Loss:   Minimal    Drains:  * No LDAs found *    Anesthesia Type:   General    Operative Indications:  Calculus of gallbladder with chronic cholecystitis without obstruction [K80 10]  History of acute calculus cholecystitis    Operative Findings:  Extensive adhesions, chronically inflamed contracted gallbladder with small palpable stones  Complications:   None    Procedure and Technique:  The patient was identified by myself taken to the operating room and placed in the supine position on the operating table  After induction of satisfactory general endotracheal anesthesia he was prepped and draped in usual sterile fashion using ChloraPrep solution  A time-out was called the patient identified as Mary Bolus IV antibiotics were confirmed as given sequential compression devices were on and functioning all parties agreed this was the appropriate patient for the proposed procedure  Local anesthetic consisting of 0 25% Marcaine with epinephrine was infiltrated following which a curvilinear infraumbilical incision was performed using the 15 scalpel blade and deepened through the skin subcutaneous tissue  The abdominal wall was elevated with a perforating towel clip and a Veress needle was introduced    After performing the saline drop test the abdomen was insufflated with carbon dioxide  After achieving satisfactory pneumoperitoneum the Veress needle was removed and a 11 mm optical cannula with a 10 mm 0 degree scope within was introduced  After breaching the peritoneum the trocar portion was removed and the scope reintroduced  There is no evidence of trauma to the viscera from the Veress needle or port placement  The patient was placed in reverse Trendelenburg position and rolled to the left side  The gallbladder was not immediately evident  There did appear to be some sclerosing changes of the liver, but not shayy cirrhosis  Under laparoscopic direction an epigastric 5 mm port and 2 subcostal 5 mm ports were placed  The liver edge was elevated revealing the fundus of a contracted gallbladder  This was grasped via the lateral subcostal port and directed cephalad  This demonstrated adhesions to the anterior surface of the gallbladder  These were lysed using blunt dissection with the [facility]  As the infundibulum of the gallbladder came into view this was retracted laterally  The adhesions around the neck of the gallbladder were lysed  In the triangle of Calot the cystic duct and the cystic artery were identified  The cystic duct was skeletonized, securely clipped and divided  The likewise the cystic artery was securely clipped and divided and the gallbladder was delivered using the hook attachment to the monopolar electrocautery  As dissection was completed the gallbladder is moved over the dome of the liver  The 10 mm scope was exchanged for a 5 mm scope which was introduced via the epigastric port  An Endo-Catch bag was introduced via the umbilical port and the gallbladder isolated within  The scope was then moved back to the umbilical port and the field was irrigated with the suction , meticulous hemostasis was obtained using the monopolar electrocautery  Field was again irrigated until returns were clear    The patient was returned to a neutral position  The scope was moved back to the epigastric port and the gallbladder was retrieved in the process of removing the 11 mm port and the Endo-Catch bag  The pneumoperitoneum was allowed to dissipate and the remaining ports were removed  Generous amounts of local anesthetic was infiltrated into all the port sites  The fascia at the 11 mm port site was repaired using interrupted figure-of-eight 0 Vicryl suture  The skin at the port sites was closed using subcuticular 4-0 Vicryl suture  Wounds were cleansed with saline and benzoin Steri-Strips light gauze dressing and Tegaderm applied  Patient subsequently made an uneventful recovery from his anesthetic was extubated in the operating room moved his waiting stretcher and taken to recovery room in good condition having tolerated the procedure well     I was present for the entire procedure, A qualified resident physician was not available and A physician assistant was required during the procedure for retraction tissue handling,dissection and suturing    Patient Disposition:  PACU     SIGNATURE: Derek Pereira MD  DATE: May 24, 2021  TIME: 9:58 AM

## 2021-05-24 NOTE — TELEPHONE ENCOUNTER
Late entry  Reached out to the patient with a post visit phone call from his appointment on 5/18/2021  Left message asking he had an questions or concerns to contact the office  Intact

## 2021-05-24 NOTE — ANESTHESIA PREPROCEDURE EVALUATION
Procedure:  CHOLECYSTECTOMY LAPAROSCOPIC (N/A Abdomen)    Relevant Problems   NEURO/PSYCH   (+) Anxiety   (+) Posttraumatic stress disorder      Other   (+) Acute cholecystitis due to biliary calculus   (+) Adjustment disorder   (+) Adult antisocial behavior   (+) Bipolar I disorder (HCC)        Physical Exam    Airway    Mallampati score: II  TM Distance: >3 FB  Neck ROM: full     Dental   No notable dental hx     Cardiovascular  Rhythm: regular, Rate: normal,     Pulmonary  Breath sounds clear to auscultation,     Other Findings        Anesthesia Plan  ASA Score- 2     Anesthesia Type- general with ASA Monitors  Additional Monitors:   Airway Plan: ETT  Plan Factors-Exercise tolerance (METS): >4 METS  Chart reviewed  EKG reviewed  Imaging results reviewed  Existing labs reviewed  Patient summary reviewed  Patient is a current smoker  Induction- intravenous  Postoperative Plan-     Informed Consent- Anesthetic plan and risks discussed with patient and mother  I personally reviewed this patient with the CRNA  Discussed and agreed on the Anesthesia Plan with the CRNA  Karrie Nissen

## 2021-05-27 ENCOUNTER — TELEPHONE (OUTPATIENT)
Dept: UROLOGY | Facility: AMBULATORY SURGERY CENTER | Age: 41
End: 2021-05-27

## 2021-06-01 NOTE — PROGRESS NOTES
Assessment and plan:       1  Left kidney hypodensity   CT scan 05/14/2021 with IV contrast revealed lobulated hypodensity in the left kidney measuring 1 6 x 1 0 cm and did not appear to represent a simple cyst   No hydronephrosis  Urinary bladder and adrenals unremarkable   creatinine 0 79 ( 05/15/2021)   Ct with and without iv contrast to further evaluate   Urine dip unremarkable    further recommendations pending results of CT renal protocol    2  lower urinary tract symptoms  · Urinary frequency and urgency since changing psych medications  · AUA 25  · Drinks 2 cups of coffee a day, drinks 32 ounces of iced tea, 16-20 ounces of soda, drinks about 3-4 bottles of water per day  · Recommend avoiding bladder irritants, pt states "let's be honest with you that is not going to happen"  Recommend he at least attempt to cut his soda and iced tea intake in half  Not interested in cutting amount of bladder irritants in half either    3  condylomas    case requested for condyloma excision      TracieMIGUELITO Pelletier    History of Present Illness     Jose Madrigal is a 38 y o  New patient who was referred in to Urology after having a CT the abdomen pelvis that revealed a lobulated hypodensity in left kidney measuring 1 6 x 1 0 cm that did not appear to represent a simple cyst   He did not have any hydronephrosis and urinary bladder and adrenals were unremarkable  He has a normal creatinine at 0 79    he does report urinary frequency with urgency and sometimes urge incontinence  Drinks 2 cups of coffee a day, drinks 32 ounces of iced tea, 16-20 ounces of soda, drinks about 3-4 bottles of water per day   I recommend he avoid bladder irritants and patient states "Lets be honest with you, that is not going to happen  I recommend that he at least attempt to cut his soda in iced tea intake in half  Patient states  "I am not going to do that either "    He is also noted to have multiple condylomas will schedule case request for this  He has a history of cholecystitis, PTSD, bipolar 1 disorder, anxiety, adult antisocial behavior and adjustment disorder  AUA SYMPTOM SCORE      Most Recent Value   AUA SYMPTOM SCORE   How often have you had a sensation of not emptying your bladder completely after you finished urinating? 5   How often have you had to urinate again less than two hours after you finished urinating? 4   How often have you found you stopped and started again several times when you urinate? 5   How often have you found it difficult to postpone urination? 4   How often have you had a weak urinary stream?  4   How often have you had to push or strain to begin urination? 1   How many times did you most typically get up to urinate from the time you went to bed at night until the time you got up in the morning? 2   Quality of Life: If you were to spend the rest of your life with your urinary condition just the way it is now, how would you feel about that?  3   AUA SYMPTOM SCORE  25        Laboratory     Lab Results   Component Value Date    BUN 6 (L) 05/15/2021    CREATININE 0 79 05/15/2021       No components found for: GFR    Lab Results   Component Value Date    CALCIUM 8 2 (L) 05/15/2021    K 3 9 05/15/2021    CO2 25 05/15/2021     05/15/2021       Lab Results   Component Value Date    WBC 8 30 05/15/2021    HGB 14 1 05/15/2021    HCT 41 5 (L) 05/15/2021    MCV 97 05/15/2021     (L) 05/15/2021       No results found for: PSA    No results found for this or any previous visit (from the past 1 hour(s))  @RESULT(URINEMICROSCOPIC)@    @RESULT(URINECULTURE)@    Radiology     CT CHEST, ABDOMEN AND PELVIS WITH IV CONTRAST 05/14/2021     INDICATION:   Abdominal pain and vomiting      COMPARISON:  None      TECHNIQUE: CT examination of the chest, abdomen and pelvis was performed   Axial, sagittal, and coronal 2D reformatted images were created from the source data and submitted for interpretation      Radiation dose length product (DLP) for this visit:  312 1 mGy-cm   This examination, like all CT scans performed in the [facility], was performed utilizing techniques to minimize radiation dose exposure, including the use of iterative   reconstruction and automated exposure control      IV Contrast:  50 mL of iohexol (OMNIPAQUE) 100 mL of iohexol (OMNIPAQUE)  Enteric Contrast: Enteric contrast was administered      FINDINGS:     CHEST     LUNGS: Patchy opacity in the right apex suggestive for focal scarring  Lungs are clear  There is no tracheal or endobronchial lesion      PLEURA:  Unremarkable      HEART/GREAT VESSELS:  Unremarkable for patient's age      MEDIASTINUM AND MARIEL:  Unremarkable      CHEST WALL AND LOWER NECK:   Unremarkable      ABDOMEN     LIVER/BILIARY TREE:  Unremarkable      GALLBLADDER:  Sludge in the gallbladder suggested with wall thickening/ pericholecystic fluid      SPLEEN:  Unremarkable      PANCREAS:  Unremarkable      ADRENAL GLANDS:  Unremarkable      KIDNEYS/URETERS:  Lobulated hypodensity in the left kidney measuring 1 6 x 1 0 cm does not represent simple cyst and ultrasound characterization recommended    No hydronephrosis      STOMACH AND BOWEL:  Unremarkable      APPENDIX:  No findings to suggest appendicitis      ABDOMINOPELVIC CAVITY:  No ascites  No pneumoperitoneum  No lymphadenopathy      VESSELS:  Unremarkable for patient's age      PELVIS     REPRODUCTIVE ORGANS:  Unremarkable for patient's age      URINARY BLADDER:  Unremarkable      ABDOMINAL WALL/INGUINAL REGIONS:  Unremarkable      OSSEOUS STRUCTURES: Mild degenerative disc changes at L5-S1  No acute fracture or destructive osseous lesion      IMPRESSION:     Lobulated hypodensity in the left kidney measuring 1 6 x 1 0 cm does not represent simple cyst and ultrasound characterization recommended    No hydronephrosis      Sludge in the gallbladder suggested with wall thickening/ pericholecystic fluid  Ultrasound correlation as clinically indicated        Review of Systems     Review of Systems   Constitutional: Negative for activity change, appetite change, chills, fatigue, fever and unexpected weight change  HENT: Negative for facial swelling  Eyes: Negative for discharge  Respiratory: Negative  Negative for cough and shortness of breath  Cardiovascular: Negative for chest pain and leg swelling  Gastrointestinal: Negative  Negative for abdominal distention, abdominal pain, constipation, diarrhea, nausea and vomiting  Endocrine: Negative  Genitourinary: Positive for frequency and urgency  Negative for decreased urine volume, difficulty urinating, dysuria, enuresis, flank pain, genital sores, hematuria, scrotal swelling and testicular pain  Nocturia x 2 with urge incontinence   Musculoskeletal: Negative for back pain and myalgias  Skin: Negative for pallor and rash  Allergic/Immunologic: Negative  Negative for immunocompromised state  Neurological: Negative for facial asymmetry and speech difficulty  Psychiatric/Behavioral: Negative for agitation and confusion  Allergies     No Known Allergies    Physical Exam     Physical Exam  Vitals signs reviewed  Constitutional:       General: He is not in acute distress  Appearance: Normal appearance  He is normal weight  He is not ill-appearing, toxic-appearing or diaphoretic  HENT:      Head: Normocephalic and atraumatic  Eyes:      General: No scleral icterus  Neck:      Musculoskeletal: Normal range of motion  Cardiovascular:      Rate and Rhythm: Normal rate  Pulmonary:      Effort: Pulmonary effort is normal  No respiratory distress  Abdominal:      General: Abdomen is flat  There is no distension  Palpations: Abdomen is soft  Tenderness: There is no abdominal tenderness  There is no right CVA tenderness, left CVA tenderness, guarding or rebound     Genitourinary:     Penis: Circumcised  Lesions present  No hypospadias, erythema, tenderness or discharge  Scrotum/Testes:         Right: Tenderness or swelling not present  Left: Tenderness or swelling not present  Epididymis:      Right: No tenderness  Left: No tenderness  Musculoskeletal:         General: No swelling  Right lower leg: No edema  Left lower leg: No edema  Skin:     General: Skin is warm and dry  Coloration: Skin is not jaundiced or pale  Findings: No rash  Neurological:      General: No focal deficit present  Mental Status: He is alert and oriented to person, place, and time  Gait: Gait normal    Psychiatric:         Mood and Affect: Mood normal          Behavior: Behavior normal          Thought Content:  Thought content normal          Judgment: Judgment normal          Vital Signs     Vitals:    06/04/21 1012   BP: 124/70   Weight: 58 kg (127 lb 13 9 oz)   Height: 5' 10" (1 778 m)       Current Medications       Current Outpatient Medications:     mirtazapine (REMERON) 15 mg tablet, Take 7 5 mg by mouth daily at bedtime , Disp: , Rfl:     QUEtiapine (SEROquel) 50 mg tablet, Take 50 mg by mouth daily at bedtime Pt is to take 25 mg in the am and 100 mg hs, Disp: , Rfl:     ondansetron (ZOFRAN-ODT) 4 mg disintegrating tablet, Take 1 tablet (4 mg total) by mouth every 6 (six) hours as needed for nausea or vomiting (Patient not taking: Reported on 1/14/2021), Disp: 10 tablet, Rfl: 0    oxyCODONE-acetaminophen (PERCOCET) 5-325 mg per tablet, Take 1 tablet by mouth every 4 (four) hours as needed for moderate pain or severe pain for up to 8 dosesMax Daily Amount: 6 tablets (Patient not taking: Reported on 6/4/2021), Disp: 8 tablet, Rfl: 0    patient supplied medication, medical marijuana, Disp: , Rfl:     Active Problems     Patient Active Problem List   Diagnosis    Posttraumatic stress disorder    Bipolar I disorder ([facility] )    Anxiety    Adult antisocial behavior    Adjustment disorder       Past Medical History     Past Medical History:   Diagnosis Date    Anxiety     Bipolar disorder [facility])        Surgical History     Past Surgical History:   Procedure Laterality Date    HERNIA REPAIR      MOLE EXCISION Left     MYRINGOTOMY W/ TUBES  1980    FL LAP,CHOLECYSTECTOMY N/A 5/24/2021    Procedure: CHOLECYSTECTOMY LAPAROSCOPIC;  Surgeon: Andrés James MD;  Location: [facility] MAIN OR;  Service: General       Family History     Family History   Problem Relation Age of Onset    Aneurysm Mother     Arthritis Father        Social History     Social History     Social History     Tobacco Use   Smoking Status Current Every Day Smoker    Packs/day: 1 00    Types: Cigarettes   Smokeless Tobacco Never Used       Past Surgical History:   Procedure Laterality Date    HERNIA REPAIR      MOLE EXCISION Left     MYRINGOTOMY W/ TUBES  1980    FL LAP,CHOLECYSTECTOMY N/A 5/24/2021    Procedure: CHOLECYSTECTOMY LAPAROSCOPIC;  Surgeon: Andrés James MD;  Location: [facility] MAIN OR;  Service: General         The following portions of the patient's history were reviewed and updated as appropriate: allergies, current medications, past family history, past medical history, past social history, past surgical history and problem list    Please note :  Voice dictation software has been used to create this document  There may be inadvertent transcription errors      [id number] [id number] Xiomy Mini

## 2021-06-03 ENCOUNTER — OFFICE VISIT (OUTPATIENT)
Dept: SURGERY | Facility: CLINIC | Age: 41
End: 2021-06-03

## 2021-06-03 DIAGNOSIS — K80.00 ACUTE CHOLECYSTITIS DUE TO BILIARY CALCULUS: Primary | ICD-10-CM

## 2021-06-03 PROCEDURE — 99024 POSTOP FOLLOW-UP VISIT: CPT | Performed by: SPECIALIST

## 2021-06-03 NOTE — PATIENT INSTRUCTIONS
Case Report   Surgical Pathology Report                         Case: [id number]                                    Authorizing Provider: Frank Correa MD         Collected:           05/24/2021 0917               Ordering Location:     [facility] Received:            05/24/2021 [address] [place] [address] Operating Room                                                        Pathologist:           Tate Garza MD                                                                   Specimen:    Gallbladder                                                                                Final Diagnosis   A  Gallbladder, cholecystectomy:  - Chronic cholecystitis and cholelithiasis  - One reactive lymph node  Electronically signed by Tate Garza MD on 5/27/2021 at 10:01 AM   Additional Information    All reported additional testing was performed with appropriately reactive controls   These tests were developed and their performance characteristics determined by [facility] or appropriate performing facility, though some tests may be performed on tissues which have not been validated for performance characteristics (such as staining performed on alcohol exposed cell blocks and decalcified tissues)   Results should be interpreted with caution and in the context of the patients clinical condition  These tests may not be cleared or approved by the [place]  Food and Drug Administration, though the FDA has determined that such clearance or approval is not necessary  These tests are used for clinical purposes and they should not be regarded as investigational or for research  This laboratory has been approved by CLIA 88, designated as a high-complexity laboratory and is qualified to perform these tests      Interpretation performed at [facility], [address] [place] [id number]     Your surgery went well, and your incisions are healing fine    I would recommend using Tylenol or Advil for pain  If you take Advil, make sure you take it with a meal     There are no restrictions on your diet  Return to unrestricted activity is at your own pace, just no contact sports for the next 4 weeks  Call and return if you are having any problems

## 2021-06-03 NOTE — ASSESSMENT & PLAN NOTE
The patient presents 10 days out from laparoscopic cholecystectomy for his follow-up visit  The patient complains of pain from his umbilical incision  He is eating, and moving his bowels, although both are very eratic due to his lifestyle  On exam, his incisions are healing uneventfully  His abdomen is soft with minimal incisional tenderness  The pathology report was reviewed with the patient, as were the intraoperative photographs  I have advised him to return to full activity at his own pace, to call in return if he is having any further problems  All will see him back on an as-needed basis

## 2021-06-03 NOTE — PROGRESS NOTES
Assessment/Plan:    Acute cholecystitis due to biliary calculus  The patient presents 10 days out from laparoscopic cholecystectomy for his follow-up visit  The patient complains of pain from his umbilical incision  He is eating, and moving his bowels, although both are very eratic due to his lifestyle  On exam, his incisions are healing uneventfully  His abdomen is soft with minimal incisional tenderness  The pathology report was reviewed with the patient, as were the intraoperative photographs  I have advised him to return to full activity at his own pace, to call in return if he is having any further problems  All will see him back on an as-needed basis  Diagnoses and all orders for this visit:    Acute cholecystitis due to biliary calculus          Subjective:      Patient ID: Luis Armando Hill is a 38 y o  male  HPI    The following portions of the patient's history were reviewed and updated as appropriate: allergies, current medications, past family history, past medical history, past social history, past surgical history and problem list     Review of Systems      Objective: There were no vitals taken for this visit           Physical Exam

## 2021-06-04 ENCOUNTER — OFFICE VISIT (OUTPATIENT)
Dept: UROLOGY | Facility: CLINIC | Age: 41
End: 2021-06-04
Payer: COMMERCIAL

## 2021-06-04 VITALS
WEIGHT: 127.87 LBS | DIASTOLIC BLOOD PRESSURE: 70 MMHG | HEIGHT: 70 IN | BODY MASS INDEX: 18.31 KG/M2 | SYSTOLIC BLOOD PRESSURE: 124 MMHG

## 2021-06-04 DIAGNOSIS — A63.0 CONDYLOMA: ICD-10-CM

## 2021-06-04 DIAGNOSIS — N28.89 RENAL MASS: Primary | ICD-10-CM

## 2021-06-04 LAB
SL AMB  POCT GLUCOSE, UA: NORMAL
SL AMB LEUKOCYTE ESTERASE,UA: NORMAL
SL AMB POCT BILIRUBIN,UA: NORMAL
SL AMB POCT BLOOD,UA: NORMAL
SL AMB POCT CLARITY,UA: CLEAR
SL AMB POCT COLOR,UA: YELLOW
SL AMB POCT KETONES,UA: NORMAL
SL AMB POCT NITRITE,UA: NORMAL
SL AMB POCT PH,UA: 5
SL AMB POCT SPECIFIC GRAVITY,UA: 1.02
SL AMB POCT URINE PROTEIN: NORMAL
SL AMB POCT UROBILINOGEN: NORMAL

## 2021-06-04 PROCEDURE — 99204 OFFICE O/P NEW MOD 45 MIN: CPT | Performed by: NURSE PRACTITIONER

## 2021-06-04 PROCEDURE — 81002 URINALYSIS NONAUTO W/O SCOPE: CPT | Performed by: NURSE PRACTITIONER

## 2021-06-04 RX ORDER — CEFAZOLIN SODIUM 1 G/50ML
1000 SOLUTION INTRAVENOUS ONCE
Status: CANCELLED | OUTPATIENT
Start: 2021-08-04 | End: 2021-06-04

## 2021-06-04 NOTE — H&P
Assessment and plan:       1  Left kidney hypodensity   CT scan 05/14/2021 with IV contrast revealed lobulated hypodensity in the left kidney measuring 1 6 x 1 0 cm and did not appear to represent a simple cyst   No hydronephrosis  Urinary bladder and adrenals unremarkable   creatinine 0 79 ( 05/15/2021)   Ct with and without iv contrast to further evaluate   Urine dip unremarkable    further recommendations pending results of CT renal protocol    2  lower urinary tract symptoms  · Urinary frequency and urgency since changing psych medications  · AUA 25  · Drinks 2 cups of coffee a day, drinks 32 ounces of iced tea, 16-20 ounces of soda, drinks about 3-4 bottles of water per day  · Recommend avoiding bladder irritants, pt states "let's be honest with you that is not going to happen"  Recommend he at least attempt to cut his soda and iced tea intake in half  Not interested in cutting amount of bladder irritants in half either    3  condylomas    case requested for condyloma excision      Renda Riedel, CRNP    History of Present Illness     Renetta Lugo is a 38 y o  New patient who was referred in to Urology after having a CT the abdomen pelvis that revealed a lobulated hypodensity in left kidney measuring 1 6 x 1 0 cm that did not appear to represent a simple cyst   He did not have any hydronephrosis and urinary bladder and adrenals were unremarkable  He has a normal creatinine at 0 79    he does report urinary frequency with urgency and sometimes urge incontinence  Drinks 2 cups of coffee a day, drinks 32 ounces of iced tea, 16-20 ounces of soda, drinks about 3-4 bottles of water per day   I recommend he avoid bladder irritants and patient states "Lets be honest with you, that is not going to happen  I recommend that he at least attempt to cut his soda in iced tea intake in half  Patient states  "I am not going to do that either "    He is also noted to have multiple condylomas will schedule case request for this  He has a history of cholecystitis, PTSD, bipolar 1 disorder, anxiety, adult antisocial behavior and adjustment disorder  AUA SYMPTOM SCORE      Most Recent Value   AUA SYMPTOM SCORE   How often have you had a sensation of not emptying your bladder completely after you finished urinating? 5   How often have you had to urinate again less than two hours after you finished urinating? 4   How often have you found you stopped and started again several times when you urinate? 5   How often have you found it difficult to postpone urination? 4   How often have you had a weak urinary stream?  4   How often have you had to push or strain to begin urination? 1   How many times did you most typically get up to urinate from the time you went to bed at night until the time you got up in the morning? 2   Quality of Life: If you were to spend the rest of your life with your urinary condition just the way it is now, how would you feel about that?  3   AUA SYMPTOM SCORE  25        Laboratory     Lab Results   Component Value Date    BUN 6 (L) 05/15/2021    CREATININE 0 79 05/15/2021       No components found for: GFR    Lab Results   Component Value Date    CALCIUM 8 2 (L) 05/15/2021    K 3 9 05/15/2021    CO2 25 05/15/2021     05/15/2021       Lab Results   Component Value Date    WBC 8 30 05/15/2021    HGB 14 1 05/15/2021    HCT 41 5 (L) 05/15/2021    MCV 97 05/15/2021     (L) 05/15/2021       No results found for: PSA    No results found for this or any previous visit (from the past 1 hour(s))  @RESULT(URINEMICROSCOPIC)@    @RESULT(URINECULTURE)@    Radiology     CT CHEST, ABDOMEN AND PELVIS WITH IV CONTRAST 05/14/2021     INDICATION:   Abdominal pain and vomiting      COMPARISON:  None      TECHNIQUE: CT examination of the chest, abdomen and pelvis was performed   Axial, sagittal, and coronal 2D reformatted images were created from the source data and submitted for interpretation      Radiation dose length product (DLP) for this visit:  312 1 mGy-cm   This examination, like all CT scans performed in the [facility], was performed utilizing techniques to minimize radiation dose exposure, including the use of iterative   reconstruction and automated exposure control      IV Contrast:  50 mL of iohexol (OMNIPAQUE) 100 mL of iohexol (OMNIPAQUE)  Enteric Contrast: Enteric contrast was administered      FINDINGS:     CHEST     LUNGS: Patchy opacity in the right apex suggestive for focal scarring  Lungs are clear  There is no tracheal or endobronchial lesion      PLEURA:  Unremarkable      HEART/GREAT VESSELS:  Unremarkable for patient's age      MEDIASTINUM AND MARIEL:  Unremarkable      CHEST WALL AND LOWER NECK:   Unremarkable      ABDOMEN     LIVER/BILIARY TREE:  Unremarkable      GALLBLADDER:  Sludge in the gallbladder suggested with wall thickening/ pericholecystic fluid      SPLEEN:  Unremarkable      PANCREAS:  Unremarkable      ADRENAL GLANDS:  Unremarkable      KIDNEYS/URETERS:  Lobulated hypodensity in the left kidney measuring 1 6 x 1 0 cm does not represent simple cyst and ultrasound characterization recommended    No hydronephrosis      STOMACH AND BOWEL:  Unremarkable      APPENDIX:  No findings to suggest appendicitis      ABDOMINOPELVIC CAVITY:  No ascites  No pneumoperitoneum  No lymphadenopathy      VESSELS:  Unremarkable for patient's age      PELVIS     REPRODUCTIVE ORGANS:  Unremarkable for patient's age      URINARY BLADDER:  Unremarkable      ABDOMINAL WALL/INGUINAL REGIONS:  Unremarkable      OSSEOUS STRUCTURES: Mild degenerative disc changes at L5-S1  No acute fracture or destructive osseous lesion      IMPRESSION:     Lobulated hypodensity in the left kidney measuring 1 6 x 1 0 cm does not represent simple cyst and ultrasound characterization recommended    No hydronephrosis      Sludge in the gallbladder suggested with wall thickening/ pericholecystic fluid  Ultrasound correlation as clinically indicated        Review of Systems     Review of Systems   Constitutional: Negative for activity change, appetite change, chills, fatigue, fever and unexpected weight change  HENT: Negative for facial swelling  Eyes: Negative for discharge  Respiratory: Negative  Negative for cough and shortness of breath  Cardiovascular: Negative for chest pain and leg swelling  Gastrointestinal: Negative  Negative for abdominal distention, abdominal pain, constipation, diarrhea, nausea and vomiting  Endocrine: Negative  Genitourinary: Positive for frequency and urgency  Negative for decreased urine volume, difficulty urinating, dysuria, enuresis, flank pain, genital sores, hematuria, scrotal swelling and testicular pain  Nocturia x 2 with urge incontinence   Musculoskeletal: Negative for back pain and myalgias  Skin: Negative for pallor and rash  Allergic/Immunologic: Negative  Negative for immunocompromised state  Neurological: Negative for facial asymmetry and speech difficulty  Psychiatric/Behavioral: Negative for agitation and confusion  Allergies     No Known Allergies    Physical Exam     Physical Exam  Vitals signs reviewed  Constitutional:       General: He is not in acute distress  Appearance: Normal appearance  He is normal weight  He is not ill-appearing, toxic-appearing or diaphoretic  HENT:      Head: Normocephalic and atraumatic  Eyes:      General: No scleral icterus  Neck:      Musculoskeletal: Normal range of motion  Cardiovascular:      Rate and Rhythm: Normal rate  Pulmonary:      Effort: Pulmonary effort is normal  No respiratory distress  Abdominal:      General: Abdomen is flat  There is no distension  Palpations: Abdomen is soft  Tenderness: There is no abdominal tenderness  There is no right CVA tenderness, left CVA tenderness, guarding or rebound     Genitourinary:     Penis: Circumcised  Lesions present  No hypospadias, erythema, tenderness or discharge  Scrotum/Testes:         Right: Tenderness or swelling not present  Left: Tenderness or swelling not present  Epididymis:      Right: No tenderness  Left: No tenderness  Musculoskeletal:         General: No swelling  Right lower leg: No edema  Left lower leg: No edema  Skin:     General: Skin is warm and dry  Coloration: Skin is not jaundiced or pale  Findings: No rash  Neurological:      General: No focal deficit present  Mental Status: He is alert and oriented to person, place, and time  Gait: Gait normal    Psychiatric:         Mood and Affect: Mood normal          Behavior: Behavior normal          Thought Content:  Thought content normal          Judgment: Judgment normal          Vital Signs     Vitals:    06/04/21 1012   BP: 124/70   Weight: 58 kg (127 lb 13 9 oz)   Height: 5' 10" (1 778 m)       Current Medications       Current Outpatient Medications:     mirtazapine (REMERON) 15 mg tablet, Take 7 5 mg by mouth daily at bedtime , Disp: , Rfl:     QUEtiapine (SEROquel) 50 mg tablet, Take 50 mg by mouth daily at bedtime Pt is to take 25 mg in the am and 100 mg hs, Disp: , Rfl:     ondansetron (ZOFRAN-ODT) 4 mg disintegrating tablet, Take 1 tablet (4 mg total) by mouth every 6 (six) hours as needed for nausea or vomiting (Patient not taking: Reported on 1/14/2021), Disp: 10 tablet, Rfl: 0    oxyCODONE-acetaminophen (PERCOCET) 5-325 mg per tablet, Take 1 tablet by mouth every 4 (four) hours as needed for moderate pain or severe pain for up to 8 dosesMax Daily Amount: 6 tablets (Patient not taking: Reported on 6/4/2021), Disp: 8 tablet, Rfl: 0    patient supplied medication, medical marijuana, Disp: , Rfl:     Active Problems     Patient Active Problem List   Diagnosis    Posttraumatic stress disorder    Bipolar I disorder ([facility] [address] )    Anxiety    Adult antisocial behavior    Adjustment disorder       Past Medical History     Past Medical History:   Diagnosis Date    Anxiety     Bipolar disorder [facility])        Surgical History     Past Surgical History:   Procedure Laterality Date    HERNIA REPAIR      MOLE EXCISION Left     MYRINGOTOMY W/ TUBES  1980    [place] LAP,CHOLECYSTECTOMY N/A 5/24/2021    Procedure: CHOLECYSTECTOMY LAPAROSCOPIC;  Surgeon: Kenny Yarbrough MD;  Location: [facility] MAIN OR;  Service: General       Family History     Family History   Problem Relation Age of Onset    Aneurysm Mother     Arthritis Father        Social History     Social History     Social History     Tobacco Use   Smoking Status Current Every Day Smoker    Packs/day: 1 00    Types: Cigarettes   Smokeless Tobacco Never Used       Past Surgical History:   Procedure Laterality Date    HERNIA REPAIR      MOLE EXCISION Left     MYRINGOTOMY W/ TUBES  1980    [place] LAP,CHOLECYSTECTOMY N/A 5/24/2021    Procedure: CHOLECYSTECTOMY LAPAROSCOPIC;  Surgeon: Kenny Yarbrough MD;  Location: [facility] MAIN OR;  Service: General         The following portions of the patient's history were reviewed and updated as appropriate: allergies, current medications, past family history, past medical history, past social history, past surgical history and problem list    Please note :  Voice dictation software has been used to create this document  There may be inadvertent transcription errors      [address] [address]

## 2021-06-04 NOTE — PATIENT INSTRUCTIONS
Kidney Cyst   WHAT YOU NEED TO KNOW:   A kidney cyst is a fluid-filled sac that grows in your kidney  A simple cyst usually does not contain cancer  A complex cyst contains calcium deposits and needs to be checked over time for cancer  You may have one or more cysts  Both kidneys may form cysts at the same time  DISCHARGE INSTRUCTIONS:   Medicines:   · Medicines  may be given to relieve pain or to prevent or treat a bacterial infection  Ask your healthcare provider how to take prescription pain medicine safely  · Take your medicine as directed  Contact your healthcare provider if you think your medicine is not helping or if you have side effects  Tell him or her if you are allergic to any medicine  Keep a list of the medicines, vitamins, and herbs you take  Include the amounts, and when and why you take them  Bring the list or the pill bottles to follow-up visits  Carry your medicine list with you in case of an emergency  Return to the emergency department if:   · You have blood in your urine or your urine is dark  · You have trouble urinating, or you are urinating more often than usual     · You have a fever along with pain or tenderness below your ribcage and above your hip bones  Contact your healthcare provider if:   · You have questions or concerns about your condition or care  Follow up with your healthcare provider as directed: You may need to have ultrasounds to check the size, shape, and contents of the cyst over time  Write down your questions so you remember to ask them during your visits  Drink liquids as directed:  Liquids help your kidneys work correctly  They can also help prevent a urinary tract infection  Ask your healthcare provider how much liquid to have each day and which liquids are best for you  Ask if you need to limit or not drink alcohol  Alcohol may damage your kidneys     Manage health conditions:  Over time, conditions such as diabetes and high blood pressure that are not controlled may damage your kidneys  Do not smoke:  Smoking may narrow blood vessels in your kidneys and raise your blood pressure  Smoking can also damage your kidneys  Ask your healthcare provider for information if you currently smoke and need help quitting  E-cigarettes or smokeless tobacco still contain nicotine  Talk to your healthcare provider before you use these products  © Copyright [address] Information is for End User's use only and may not be sold, redistributed or otherwise used for commercial purposes  All illustrations and images included in CareNotes® are the copyrighted property of [employer]  or [facility] Lara Trejo   The above information is an  only  It is not intended as medical advice for individual conditions or treatments  Talk to your doctor, nurse or pharmacist before following any medical regimen to see if it is safe and effective for you

## 2021-06-07 ENCOUNTER — TELEPHONE (OUTPATIENT)
Dept: UROLOGY | Facility: MEDICAL CENTER | Age: 41
End: 2021-06-07

## 2021-06-07 NOTE — TELEPHONE ENCOUNTER
I left message with patients father to have him call me back to schedule surgery with Dr Renetta Washington

## 2021-06-10 NOTE — TELEPHONE ENCOUNTER
I spoke to patients mother Javad Green and scheduled surgery with Dr Rodger Hernandez on 8/4/21 at [address]  Patient does not require pats  I am mailing patient a surgery packet

## 2021-06-11 ENCOUNTER — HOSPITAL ENCOUNTER (OUTPATIENT)
Dept: CT IMAGING | Facility: HOSPITAL | Age: 41
Discharge: HOME | End: 2021-06-11
Payer: COMMERCIAL

## 2021-06-11 DIAGNOSIS — N28.89 RENAL MASS: ICD-10-CM

## 2021-06-11 PROCEDURE — 74178 CT ABD&PLV WO CNTR FLWD CNTR: CPT

## 2021-06-11 PROCEDURE — G1004 CDSM NDSC: HCPCS

## 2021-06-11 RX ADMIN — IOHEXOL 100 ML: 350 INJECTION, SOLUTION INTRAVENOUS at 14:28

## 2021-06-13 ENCOUNTER — APPOINTMENT (EMERGENCY)
Dept: RADIOLOGY | Facility: HOSPITAL | Age: 41
End: 2021-06-13
Payer: COMMERCIAL

## 2021-06-13 ENCOUNTER — HOSPITAL ENCOUNTER (EMERGENCY)
Facility: HOSPITAL | Age: 41
Discharge: HOME | End: 2021-06-13
Attending: EMERGENCY MEDICINE
Payer: COMMERCIAL

## 2021-06-13 VITALS
BODY MASS INDEX: 17.9 KG/M2 | RESPIRATION RATE: 16 BRPM | SYSTOLIC BLOOD PRESSURE: 118 MMHG | HEART RATE: 80 BPM | HEIGHT: 70 IN | TEMPERATURE: 97.9 F | WEIGHT: 125 LBS | OXYGEN SATURATION: 96 % | DIASTOLIC BLOOD PRESSURE: 76 MMHG

## 2021-06-13 DIAGNOSIS — S63.501A WRIST SPRAIN, RIGHT, INITIAL ENCOUNTER: Primary | ICD-10-CM

## 2021-06-13 PROCEDURE — 73090 X-RAY EXAM OF FOREARM: CPT

## 2021-06-13 PROCEDURE — 73080 X-RAY EXAM OF ELBOW: CPT

## 2021-06-13 PROCEDURE — 99285 EMERGENCY DEPT VISIT HI MDM: CPT | Performed by: EMERGENCY MEDICINE

## 2021-06-13 PROCEDURE — 73110 X-RAY EXAM OF WRIST: CPT

## 2021-06-13 PROCEDURE — 99283 EMERGENCY DEPT VISIT LOW MDM: CPT

## 2021-06-13 RX ORDER — IBUPROFEN 600 MG/1
600 TABLET ORAL ONCE
Status: COMPLETED | OUTPATIENT
Start: 2021-06-13 | End: 2021-06-13

## 2021-06-13 RX ADMIN — IBUPROFEN 600 MG: 600 TABLET, FILM COATED ORAL at 22:17

## 2021-06-14 NOTE — ED PROVIDER NOTES
History  Chief Complaint   Patient presents with    Wrist Pain     Patient reports he was helping a friend and injured right wrist       Patient was kayaking and he had to carry his kayak and his friend up a hill, Afterwards experienced pain to right wrist  No discrete trauma  No f/c/n/v/cp/sob/abdo pain/urinary/bowel symp  Only PMH is bipolar  No pmh gout or other arthropathy  Prior to Admission Medications   Prescriptions Last Dose Informant Patient Reported? Taking? QUEtiapine (SEROquel) 50 mg tablet 6/12/2021 at Unknown time Self Yes Yes   Sig: Take 50 mg by mouth daily at bedtime Pt is to take 25 mg in the am and 100 mg hs   mirtazapine (REMERON) 15 mg tablet 6/12/2021 at Unknown time  Yes Yes   Sig: Take 7 5 mg by mouth daily at bedtime    ondansetron (ZOFRAN-ODT) 4 mg disintegrating tablet Not Taking at Unknown time  No No   Sig: Take 1 tablet (4 mg total) by mouth every 6 (six) hours as needed for nausea or vomiting   Patient not taking: Reported on 1/14/2021   oxyCODONE-acetaminophen (PERCOCET) 5-325 mg per tablet Not Taking at Unknown time Self No No   Sig: Take 1 tablet by mouth every 4 (four) hours as needed for moderate pain or severe pain for up to 8 dosesMax Daily Amount: 6 tablets   Patient not taking: Reported on 6/4/2021   patient supplied medication  Self Yes No   Sig: medical marijuana      Facility-Administered Medications: None       Past Medical History:   Diagnosis Date    Anxiety     Bipolar disorder ([facility] [address] )        Past Surgical History:   Procedure Laterality Date    HERNIA REPAIR      MOLE EXCISION Left     MYRINGOTOMY W/ TUBES  1980    [place] LAP,CHOLECYSTECTOMY N/A 5/24/2021    Procedure: CHOLECYSTECTOMY LAPAROSCOPIC;  Surgeon: Shani De Leon MD;  Location: [facility] MAIN OR;  Service: General       Family History   Problem Relation Age of Onset    Aneurysm Mother     Arthritis Father      I have reviewed and agree with the history as documented      E-Cigarette/Vaping    E-Cigarette Use Current Every Day User     Comments Medical marijuana      E-Cigarette/Vaping Substances    Nicotine No     THC Yes     CBD Yes     Flavoring No     Other No     Unknown No      Social History     Tobacco Use    Smoking status: Current Every Day Smoker     Packs/day: 1 00     Types: Cigarettes    Smokeless tobacco: Never Used   Vaping Use    Vaping Use: Every day    Substances: THC, CBD   Substance Use Topics    Alcohol use: Never    Drug use: Yes     Types: Marijuana       Review of Systems   Constitutional: Negative for fever  HENT: Negative for rhinorrhea  Eyes: Negative for visual disturbance  Respiratory: Negative for shortness of breath  Cardiovascular: Negative for chest pain  Gastrointestinal: Negative for abdominal pain, diarrhea and vomiting  Endocrine: Negative for polydipsia  Genitourinary: Negative for dysuria, frequency and hematuria  Musculoskeletal: Negative for neck stiffness  Skin: Negative for rash  Allergic/Immunologic: Negative for immunocompromised state  Neurological: Negative for speech difficulty, weakness and numbness  Psychiatric/Behavioral: Negative for hallucinations and suicidal ideas  Physical Exam  Physical Exam  Constitutional:       Appearance: He is well-developed  HENT:      Head: Normocephalic and atraumatic  Eyes:      Conjunctiva/sclera: Conjunctivae normal    Cardiovascular:      Rate and Rhythm: Normal rate and regular rhythm  Pulmonary:      Effort: Pulmonary effort is normal    Musculoskeletal:      Comments: Patient is somewhat agitated and very jittery and flinching with any movement of right elbow even isolating it from wrist, but insists pain is only at distal ulna/wrist  No focal tenderness to elbow and with redirection can range it fully  Wrist is warm to touch, no erythema, no effusion  Able to range with pain  Distal NVI  No other focal tenderness  No ecchymosis  No deformity/instability     Skin: General: Skin is warm and dry  Neurological:      Mental Status: He is alert and oriented to person, place, and time  Psychiatric:      Comments: Pressured speech         Vital Signs  ED Triage Vitals [06/13/21 2203]   Temperature Pulse Respirations Blood Pressure SpO2   97 9 °F (36 6 °C) 86 16 118/75 96 %      Temp Source Heart Rate Source Patient Position - Orthostatic VS BP Location FiO2 (%)   Tympanic Monitor Lying Left arm --      Pain Score       8           Vitals:    06/13/21 2203 06/13/21 2301   BP: 118/75 118/76   Pulse: 86 80   Patient Position - Orthostatic VS: Lying          Visual Acuity      ED Medications  Medications   ibuprofen (MOTRIN) tablet 600 mg (600 mg Oral Given 6/13/21 2217)       Diagnostic Studies  Results Reviewed     None                 XR elbow 3+ views RIGHT   Final Result by Gerard Lindsay MD (06/14 4030)      No acute osseous abnormality  Workstation performed: [id number]         XR forearm 2 views RIGHT   Final Result by Gerard Lindsay MD (06/14 3490)      No acute osseous abnormality  Workstation performed: [id number]         XR wrist 3+ views RIGHT   ED Interpretation by Jessie Rahman MD (06/13 2246)   Elbow/forearm/wrist XR all neg for fx/dl      Final Result by Gerard Lindsay MD (06/14 7323)      No acute osseous abnormality  Workstation performed: [id number]                    Procedures  Procedures         ED Course                             SBIRT 22yo+      Most Recent Value   SBIRT (22 yo +)   In order to provide better care to our patients, we are screening all of our patients for alcohol and drug use  Would it be okay to ask you these screening questions?   Unable to answer at this time Filed at: 06/13/2021 2217                    MDM  Number of Diagnoses or Management Options  Wrist sprain, right, initial encounter  Diagnosis management comments: XRays all neg, manage as wrist sprain, splint applied      Disposition  Final diagnoses:   Wrist sprain, right, initial encounter     Time reflects when diagnosis was documented in both MDM as applicable and the Disposition within this note     Time User Action Codes Description Comment    6/13/2021 10:46 PM Kimani Prado [address] Wrist sprain, right, initial encounter       ED Disposition     ED Disposition Condition Date/Time Comment    Discharge Stable Sun Jun 13, 2021 10:46 PM Johny Alvarez discharge to home/self care  Follow-up Information     Follow up With Specialties Details Why Contact Info    Primary Care Provider - see in next 48 hours  Schedule an appointment as soon as possible for a visit in 2 days Return to ER if symptoms worsen or new symptoms develop     Siomara Augustin MD Orthopedic Surgery Schedule an appointment as soon as possible for a visit in 1 week  [place] [address]  [phone]            Discharge Medication List as of 6/13/2021 10:47 PM      CONTINUE these medications which have NOT CHANGED    Details   mirtazapine (REMERON) 15 mg tablet Take 7 5 mg by mouth daily at bedtime , Historical Med      QUEtiapine (SEROquel) 50 mg tablet Take 50 mg by mouth daily at bedtime Pt is to take 25 mg in the am and 100 mg hs, Starting Thu 5/6/2021, Historical Med      ondansetron (ZOFRAN-ODT) 4 mg disintegrating tablet Take 1 tablet (4 mg total) by mouth every 6 (six) hours as needed for nausea or vomiting, Starting Mon 12/21/2020, Normal      oxyCODONE-acetaminophen (PERCOCET) 5-325 mg per tablet Take 1 tablet by mouth every 4 (four) hours as needed for moderate pain or severe pain for up to 8 dosesMax Daily Amount: 6 tablets, Starting Mon 5/24/2021, Normal      patient supplied medication medical marijuana, Historical Med           No discharge procedures on file      PDMP Review       Value Time User    PDMP Reviewed  Yes 5/24/2021 10:11 AM Radha Dickerson PA-C          ED Provider  Electronically Signed by           Tamy Carlin MD  06/14/21 3665

## 2021-06-15 ENCOUNTER — TELEPHONE (OUTPATIENT)
Dept: OBGYN CLINIC | Facility: CLINIC | Age: 41
End: 2021-06-15

## 2021-06-28 ENCOUNTER — HOSPITAL ENCOUNTER (EMERGENCY)
Facility: HOSPITAL | Age: 41
Discharge: HOME | End: 2021-06-28
Attending: EMERGENCY MEDICINE
Payer: COMMERCIAL

## 2021-06-28 ENCOUNTER — OFFICE VISIT (OUTPATIENT)
Dept: URGENT CARE | Facility: CLINIC | Age: 41
End: 2021-06-28
Payer: COMMERCIAL

## 2021-06-28 VITALS
DIASTOLIC BLOOD PRESSURE: 74 MMHG | OXYGEN SATURATION: 96 % | SYSTOLIC BLOOD PRESSURE: 115 MMHG | RESPIRATION RATE: 16 BRPM | BODY MASS INDEX: 18.51 KG/M2 | WEIGHT: 129 LBS | TEMPERATURE: 98.5 F | HEART RATE: 79 BPM

## 2021-06-28 VITALS
HEART RATE: 76 BPM | RESPIRATION RATE: 18 BRPM | OXYGEN SATURATION: 98 % | SYSTOLIC BLOOD PRESSURE: 107 MMHG | TEMPERATURE: 98.3 F | DIASTOLIC BLOOD PRESSURE: 59 MMHG

## 2021-06-28 DIAGNOSIS — K08.89 PAIN, DENTAL: Primary | ICD-10-CM

## 2021-06-28 PROCEDURE — 64450 NJX AA&/STRD OTHER PN/BRANCH: CPT | Performed by: EMERGENCY MEDICINE

## 2021-06-28 PROCEDURE — 99282 EMERGENCY DEPT VISIT SF MDM: CPT | Performed by: EMERGENCY MEDICINE

## 2021-06-28 PROCEDURE — 99213 OFFICE O/P EST LOW 20 MIN: CPT | Performed by: PHYSICIAN ASSISTANT

## 2021-06-28 PROCEDURE — 99282 EMERGENCY DEPT VISIT SF MDM: CPT

## 2021-06-28 RX ORDER — AMOXICILLIN 500 MG/1
500 CAPSULE ORAL EVERY 8 HOURS SCHEDULED
Qty: 22 CAPSULE | Refills: 0 | Status: SHIPPED | OUTPATIENT
Start: 2021-06-28 | End: 2021-07-05

## 2021-06-28 RX ORDER — CLINDAMYCIN HYDROCHLORIDE 150 MG/1
300 CAPSULE ORAL ONCE
Status: COMPLETED | OUTPATIENT
Start: 2021-06-28 | End: 2021-06-28

## 2021-06-28 RX ORDER — LIDOCAINE HYDROCHLORIDE 20 MG/ML
5 INJECTION, SOLUTION EPIDURAL; INFILTRATION; INTRACAUDAL; PERINEURAL ONCE
Status: COMPLETED | OUTPATIENT
Start: 2021-06-28 | End: 2021-06-28

## 2021-06-28 RX ORDER — BUPIVACAINE HYDROCHLORIDE 5 MG/ML
10 INJECTION, SOLUTION EPIDURAL; INTRACAUDAL ONCE
Status: COMPLETED | OUTPATIENT
Start: 2021-06-28 | End: 2021-06-28

## 2021-06-28 RX ADMIN — LIDOCAINE HYDROCHLORIDE 5 ML: 20 INJECTION, SOLUTION EPIDURAL; INFILTRATION; INTRACAUDAL; PERINEURAL at 20:01

## 2021-06-28 RX ADMIN — BUPIVACAINE HYDROCHLORIDE 10 ML: 5 INJECTION, SOLUTION EPIDURAL; INTRACAUDAL at 20:01

## 2021-06-28 RX ADMIN — CLINDAMYCIN HYDROCHLORIDE 300 MG: 150 CAPSULE ORAL at 20:01

## 2021-06-28 NOTE — PATIENT INSTRUCTIONS
Take amoxicillin as prescribed  Follow up with dentist  Salt water gargles  Ice over area  Ibuprofen 600-800mg + Tylenol 1000mg every 6 hours  Do not exceed this amount  Follow up with PCP in 3-5 days  Proceed to  ER if symptoms worsen  Dental Abscess   WHAT YOU NEED TO KNOW:   A dental abscess is a collection of pus in or around a tooth  A dental abscess is caused by bacteria  The bacteria usually enter the tooth when the enamel (outer part of the tooth) is damaged by tooth decay  Bacteria may also enter the tooth through a break or chip in the tooth, or a cut in the gum  Food particles that are stuck between the teeth for a long time may also lead to an abscess  DISCHARGE INSTRUCTIONS:   Return to the emergency department if:   · You have severe pain  · You have trouble breathing because of pain or swelling  Contact your healthcare provider if:   · Your symptoms get worse, even after treatment  · Your mouth is bleeding  · You cannot eat or drink because of pain or swelling  · Your abscess returns  · You have an injury that causes a crack in your tooth  · You have questions or concerns about your condition or care  Medicines: You may  need any of the following:  · Antibiotics  help treat a bacterial infection  · NSAIDs , such as ibuprofen, help decrease swelling, pain, and fever  This medicine is available with or without a doctor's order  NSAIDs can cause stomach bleeding or kidney problems in certain people  If you take blood thinner medicine, always ask your healthcare provider if NSAIDs are safe for you  Always read the medicine label and follow directions  · Acetaminophen  decreases pain and fever  It is available without a doctor's order  Ask how much to take and how often to take it  Follow directions   Read the labels of all other medicines you are using to see if they also contain acetaminophen, or ask your doctor or pharmacist  Acetaminophen can cause liver damage if not taken correctly  Do not use more than 4 grams (4,000 milligrams) total of acetaminophen in one day  · Prescription pain medicine  may be given  Ask your healthcare provider how to take this medicine safely  Some prescription pain medicines contain acetaminophen  Do not take other medicines that contain acetaminophen without talking to your healthcare provider  Too much acetaminophen may cause liver damage  Prescription pain medicine may cause constipation  Ask your healthcare provider how to prevent or treat constipation  · Take your medicine as directed  Contact your healthcare provider if you think your medicine is not helping or if you have side effects  Tell him of her if you are allergic to any medicine  Keep a list of the medicines, vitamins, and herbs you take  Include the amounts, and when and why you take them  Bring the list or the pill bottles to follow-up visits  Carry your medicine list with you in case of an emergency  Self-care:   · Rinse your mouth every 2 hours with salt water  This will help keep the area clean  · Gently brush your teeth twice a day with a soft tooth brush  This will help keep the area clean  · Eat soft foods as directed  Soft foods may cause less pain  Examples include applesauce, yogurt, and cooked pasta  Ask your healthcare provider how long to follow this instruction  · Apply a warm compress to your tooth or gum  Use a cotton ball or gauze soaked in warm water  Remove the compress in 10 minutes or when it becomes cool  Repeat 3 times a day  Prevent another abscess:   · Brush your teeth at least 2 times a day with fluoride toothpaste  · Use dental floss to clean between your teeth at least once a day  · Rinse your mouth with water or mouthwash after meals and snacks  · Chew sugarless gum after meals and snacks  · Limit foods that are sticky and high in sugar such as raisons  Also limit drinks high in sugar, such as soda       · See your dentist every 6 months for dental cleanings and oral exams  Follow up with your healthcare provider in 24 hours: Your healthcare provider will need to check your teeth and gums  Write down your questions so you remember to ask them during your visits  © Copyright [address] Information is for End User's use only and may not be sold, redistributed or otherwise used for commercial purposes  All illustrations and images included in CareNotes® are the copyrighted property of [employer]  or [facility] Lara Trejo   The above information is an  only  It is not intended as medical advice for individual conditions or treatments  Talk to your doctor, nurse or pharmacist before following any medical regimen to see if it is safe and effective for you

## 2021-06-29 NOTE — ED PROVIDER NOTES
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 Taking? QUEtiapine (SEROquel) 50 mg tablet  Self Yes Yes   Sig: Take 50 mg by mouth daily at bedtime Pt is to take 25 mg in the am and 100 mg hs   amoxicillin (AMOXIL) 500 mg capsule   No Yes   Sig: Take 1 capsule (500 mg total) by mouth every 8 (eight) hours for 7 days   Take 2 capsules for first dose     mirtazapine (REMERON) 15 mg tablet   Yes Yes   Sig: Take 7 5 mg by mouth daily at bedtime    ondansetron (ZOFRAN-ODT) 4 mg disintegrating tablet   No No   Sig: Take 1 tablet (4 mg total) by mouth every 6 (six) hours as needed for nausea or vomiting   Patient not taking: Reported on 1/14/2021   oxyCODONE-acetaminophen (PERCOCET) 5-325 mg per tablet  Self No No   Sig: Take 1 tablet by mouth every 4 (four) hours as needed for moderate pain or severe pain for up to 8 dosesMax Daily Amount: 6 tablets   Patient not taking: Reported on 6/4/2021   patient supplied medication  Self Yes No   Sig: medical marijuana   Patient not taking: Reported on 6/28/2021      Facility-Administered Medications: None       Past Medical History:   Diagnosis Date    Anxiety     Bipolar disorder ([facility] [address] )        Past Surgical History:   Procedure Laterality Date    HERNIA REPAIR      MOLE EXCISION Left     MYRINGOTOMY W/ TUBES  1980    [place] LAP,CHOLECYSTECTOMY N/A 5/24/2021    Procedure: CHOLECYSTECTOMY LAPAROSCOPIC;  Surgeon: Gaby Thayer MD;  Location: [address] [place] [address] OR;  Service: General       Family History   Problem Relation Age of Onset    Aneurysm Mother     Arthritis Father      I have reviewed and agree with the history as documented  E-Cigarette/Vaping    E-Cigarette Use Current Every Day User     Comments Medical marijuana      E-Cigarette/Vaping Substances    Nicotine No     THC Yes     CBD Yes     Flavoring No     Other No     Unknown No      Social History     Tobacco Use    Smoking status: Current Every Day Smoker     Packs/day: 1 00     Types: Cigarettes    Smokeless tobacco: Never Used   Vaping Use    Vaping Use: Every day    Substances: THC, CBD   Substance Use Topics    Alcohol use: Never    Drug use: Yes     Types: Marijuana       Review of Systems   Constitutional: Negative for chills and fever  Eyes: Negative for visual disturbance  Respiratory: Negative for shortness of breath  Cardiovascular: Negative for chest pain  Gastrointestinal: Negative for abdominal distention and abdominal pain  Endocrine: Negative for polyuria  Genitourinary: Negative for decreased urine volume and dysuria  Neurological: Negative for dizziness, syncope and weakness  Physical Exam  Physical Exam  Constitutional:       General: He is not in acute distress  Appearance: He is well-developed  He is not ill-appearing, toxic-appearing or diaphoretic  HENT:      Head: Normocephalic and atraumatic  Right Ear: External ear normal       Left Ear: External ear normal       Nose: Nose normal       Mouth/Throat:      Mouth: Mucous membranes are moist       Comments: Poor dentition  Pain to palpation of the posterior most left inferior molar  Unable to count teeth due to number of teeth missing  No obvious drainable abscess  Eyes:      Conjunctiva/sclera: Conjunctivae normal       Pupils: Pupils are equal, round, and reactive to light  Cardiovascular:      Rate and Rhythm: Normal rate and regular rhythm  Heart sounds: Normal heart sounds  Pulmonary:      Effort: Pulmonary effort is normal  No respiratory distress  Breath sounds: Normal breath sounds  Abdominal:      General: Bowel sounds are normal       Palpations: Abdomen is soft  Musculoskeletal:         General: Normal range of motion  Cervical back: Normal range of motion and neck supple  Skin:     General: Skin is warm and dry  Neurological:      Mental Status: He is alert and oriented to person, place, and time  Psychiatric:         Behavior: Behavior normal          Thought Content: Thought content normal          Judgment: Judgment normal          Vital Signs  ED Triage Vitals [06/28/21 1951]   Temperature Pulse Respirations Blood Pressure SpO2   98 5 °F (36 9 °C) 79 16 115/74 96 %      Temp src Heart Rate Source Patient Position - Orthostatic VS BP Location FiO2 (%)   -- -- -- -- --      Pain Score       --           Vitals:    06/28/21 1951   BP: 115/74   Pulse: 79         Visual Acuity      ED Medications  Medications   clindamycin (CLEOCIN) capsule 300 mg (300 mg Oral Given 6/28/21 2001)   lidocaine (PF) (XYLOCAINE-MPF) 2 % injection 5 mL (5 mL Infiltration Given 6/28/21 2001)   bupivacaine (PF) (MARCAINE) 0 5 % injection 10 mL (10 mL Infiltration Given 6/28/21 2001)       Diagnostic Studies  Results Reviewed     None                 No orders to display              Procedures  Nerve block    Date/Time: 6/28/2021 8:30 PM  Performed by: Domitila Toro MD  Authorized by: Domitila Toro MD   Universal Protocol:  Consent: Verbal consent obtained    Consent given by: patient  Patient understanding: patient states understanding of the procedure being performed  Patient identity confirmed: verbally with patient and arm band      Indications:     Indications:  Pain relief  Location:     Body area:  Head    Laterality:  Left  Procedure details (see MAR for exact dosages): Block needle gauge:  25 G    Anesthetic injected:  Bupivacaine 0 5% w/o epi and lidocaine 2% w/o epi  Post-procedure details:     Outcome:  Anesthesia achieved    Patient tolerance of procedure: Tolerated well, no immediate complications  Comments:      3 mL bupivacaine, 1 mL lidocaine             ED Course                             SBIRT 20yo+      Most Recent Value   SBIRT (24 yo +)   In order to provide better care to our patients, we are screening all of our patients for alcohol and drug use  Would it be okay to ask you these screening questions? Yes Filed at: 06/28/2021 2003   Initial Alcohol Screen: US AUDIT-C    1  How often do you have a drink containing alcohol?  0 Filed at: 06/28/2021 2003   2  How many drinks containing alcohol do you have on a typical day you are drinking? 0 Filed at: 06/28/2021 2003   3a  Male UNDER 65: How often do you have five or more drinks on one occasion? 0 Filed at: 06/28/2021 2003   3b  FEMALE Any Age, or MALE 65+: How often do you have 4 or more drinks on one occassion? 0 Filed at: 06/28/2021 2003   Audit-C Score  0 Filed at: 06/28/2021 2003   PEG: How many times in the past year have you    Used an illegal drug or used a prescription medication for non-medical reasons? Never Filed at: 06/28/2021 2003                    MDM  Number of Diagnoses or Management Options  Pain, dental: new and requires workup  Diagnosis management comments: This 50-year-old man with dental pain  He appears clinically well  Complete relief achieved with dental block  Patient taking antibiotics as prescribed from urgent care and will continue to do so  Given information for [facility] out of [address]  Discussed warning signs and symptoms with the patient as well as when to return to the emergency department versus follow up with [facility]  Patient states understanding and agreement with the plan  This note was completed using dictation software  Disposition  Final diagnoses:   Pain, dental     Time reflects when diagnosis was documented in both MDM as applicable and the Disposition within this note     Time User Action Codes Description Comment    6/28/2021  7:53 PM Valentín Susie Add [K08 89] Pain, dental       ED Disposition     ED Disposition Condition Date/Time Comment    Discharge Stable Mon Jun 28, 2021  7:53 PM Rocio Jauregui discharge to home/self care  Follow-up Information     Follow up With Specialties Details Why [address] [facility] [place]    [place] [id number]  [phone]          Patient's Medications   Discharge Prescriptions    No medications on file     No discharge procedures on file      PDMP Review       Value Time User    PDMP Reviewed  Yes 5/24/2021 10:11 AM Valeri Holley PA-C          ED Provider  Electronically Signed by           Zaheer Pena MD  06/28/21 2031

## 2021-07-26 ENCOUNTER — TELEPHONE (OUTPATIENT)
Dept: UROLOGY | Facility: MEDICAL CENTER | Age: 41
End: 2021-07-26

## 2021-07-27 NOTE — PRE-PROCEDURE INSTRUCTIONS
Pre-Surgery Instructions:   Medication Instructions    mirtazapine (REMERON) 15 mg tablet Instructed patient per Anesthesia Guidelines   QUEtiapine (SEROquel) 50 mg tablet Instructed patient per Anesthesia Guidelines  Pt instructed to take remeron and seroquel the morning of surgery with a small sip of water  [facility] preop instructions reviewed with pt  Pt will get surgical soap

## 2021-08-03 ENCOUNTER — ANESTHESIA EVENT (OUTPATIENT)
Dept: PERIOP | Facility: HOSPITAL | Age: 41
End: 2021-08-03
Payer: COMMERCIAL

## 2021-08-03 RX ORDER — MEPERIDINE HYDROCHLORIDE 25 MG/ML
12.5 INJECTION INTRAMUSCULAR; INTRAVENOUS; SUBCUTANEOUS ONCE AS NEEDED
Status: CANCELLED | OUTPATIENT
Start: 2021-08-03

## 2021-08-03 RX ORDER — HYDROMORPHONE HCL/PF 1 MG/ML
0.5 SYRINGE (ML) INJECTION
Status: CANCELLED | OUTPATIENT
Start: 2021-08-03

## 2021-08-03 RX ORDER — PROMETHAZINE HYDROCHLORIDE 25 MG/ML
6.25 INJECTION, SOLUTION INTRAMUSCULAR; INTRAVENOUS ONCE AS NEEDED
Status: CANCELLED | OUTPATIENT
Start: 2021-08-03

## 2021-08-03 RX ORDER — ONDANSETRON 2 MG/ML
4 INJECTION INTRAMUSCULAR; INTRAVENOUS ONCE AS NEEDED
Status: CANCELLED | OUTPATIENT
Start: 2021-08-03

## 2021-08-03 RX ORDER — FENTANYL CITRATE/PF 50 MCG/ML
50 SYRINGE (ML) INJECTION
Status: CANCELLED | OUTPATIENT
Start: 2021-08-03

## 2021-08-03 NOTE — H&P
Ayan Greenfield, Cher Ruiz   Nurse Practitioner   Specialty:  Urology   H&P      Attested   Encounter Date:  6/4/2021           History and physical- updated by me 8/4/2021  Rosalia Fischer MD    plan excision of condyloma, fulguration as well  The risks of bleeding infection poor cosmetic result   and damage to the penis explained he gives informed consent  Attested        Attestation signed by Rosalia Fischer MD at 6/4/2021 3:11 PM   I was the supervising/collaborating physician for this visit     I agree with the provider's documentation and plan                Assessment and plan:         1  Left kidney hypodensity  · CT scan 05/14/2021 with IV contrast revealed lobulated hypodensity in the left kidney measuring 1 6 x 1 0 cm and did not appear to represent a simple cyst   No hydronephrosis  Urinary bladder and adrenals unremarkable  · creatinine 0 79 ( 05/15/2021)  · Ct with and without iv contrast to further evaluate  · Urine dip unremarkable  ·  further recommendations pending results of CT renal protocol     2  lower urinary tract symptoms  · Urinary frequency and urgency since changing psych medications  · AUA 25  · Drinks 2 cups of coffee a day, drinks 32 ounces of iced tea, 16-20 ounces of soda, drinks about 3-4 bottles of water per day  · Recommend avoiding bladder irritants, pt states "let's be honest with you that is not going to happen"  Recommend he at least attempt to cut his soda and iced tea intake in half  Not interested in cutting amount of bladder irritants in half either     3  condylomas  ·  case requested for condyloma excision        MIGUELITO Negrete     History of Present Illness      Laddie Severin is a 38 y o    New patient who was referred in to Urology after having a CT the abdomen pelvis that revealed a lobulated hypodensity in left kidney measuring 1 6 x 1 0 cm that did not appear to represent a simple cyst   He did not have any hydronephrosis and urinary bladder and adrenals were unremarkable  He has a normal creatinine at 0 79    he does report urinary frequency with urgency and sometimes urge incontinence  Drinks 2 cups of coffee a day, drinks 32 ounces of iced tea, 16-20 ounces of soda, drinks about 3-4 bottles of water per day   I recommend he avoid bladder irritants and patient states "Lets be honest with you, that is not going to happen  I recommend that he at least attempt to cut his soda in iced tea intake in half  Patient states  "I am not going to do that either "  He is also noted to have multiple condylomas will schedule case request for this  He has a history of cholecystitis, PTSD, bipolar 1 disorder, anxiety, adult antisocial behavior and adjustment disorder          AUA SYMPTOM SCORE      Most Recent Value   AUA SYMPTOM SCORE   How often have you had a sensation of not emptying your bladder completely after you finished urinating? 5   How often have you had to urinate again less than two hours after you finished urinating? 4   How often have you found you stopped and started again several times when you urinate? 5   How often have you found it difficult to postpone urination? 4   How often have you had a weak urinary stream?  4   How often have you had to push or strain to begin urination? 1   How many times did you most typically get up to urinate from the time you went to bed at night until the time you got up in the morning?   2   Quality of Life: If you were to spend the rest of your life with your urinary condition just the way it is now, how would you feel about that?  3   AUA SYMPTOM SCORE  25          Laboratory            Lab Results   Component Value Date     BUN 6 (L) 05/15/2021     CREATININE 0 79 05/15/2021         No components found for: GFR           Lab Results   Component Value Date     CALCIUM 8 2 (L) 05/15/2021     K 3 9 05/15/2021     CO2 25 05/15/2021      05/15/2021               Lab Results   Component Value Date     WBC 8 30 05/15/2021     HGB 14 1 05/15/2021     HCT 41 5 (L) 05/15/2021     MCV 97 05/15/2021      (L) 05/15/2021         No results found for: PSA     No results found for this or any previous visit (from the past 1 hour(s))      @RESULT(URINEMICROSCOPIC)@     @RESULT(URINECULTURE)@     Radiology      CT CHEST, ABDOMEN AND PELVIS WITH IV CONTRAST 05/14/2021     INDICATION:   Abdominal pain and vomiting      COMPARISON:  None      TECHNIQUE: CT examination of the chest, abdomen and pelvis was performed  Axial, sagittal, and coronal 2D reformatted images were created from the source data and submitted for interpretation      Radiation dose length product (DLP) for this visit:  312 1 mGy-cm    This examination, like all CT scans performed in the [facility], was performed utilizing techniques to minimize radiation dose exposure, including the use of iterative   reconstruction and automated exposure control      IV Contrast:  50 mL of iohexol (OMNIPAQUE) 100 mL of iohexol (OMNIPAQUE)  Enteric Contrast: Enteric contrast was administered      FINDINGS:     CHEST     LUNGS: Patchy opacity in the right apex suggestive for focal scarring   Lungs are clear   There is no tracheal or endobronchial lesion      PLEURA:  Unremarkable      HEART/GREAT VESSELS:  Unremarkable for patient's age      MEDIASTINUM AND MARIEL:  Unremarkable      CHEST WALL AND LOWER NECK:   Unremarkable      ABDOMEN     LIVER/BILIARY TREE:  Unremarkable      GALLBLADDER:  Sludge in the gallbladder suggested with wall thickening/ pericholecystic fluid      SPLEEN:  Unremarkable      PANCREAS:  Unremarkable      ADRENAL GLANDS:  Unremarkable      KIDNEYS/URETERS:  Lobulated hypodensity in the left kidney measuring 1 6 x 1 0 cm does not represent simple cyst and ultrasound characterization recommended    No hydronephrosis      STOMACH AND BOWEL:  Unremarkable      APPENDIX:  No findings to suggest appendicitis      ABDOMINOPELVIC CAVITY:  No ascites   No pneumoperitoneum   No lymphadenopathy      VESSELS:  Unremarkable for patient's age      PELVIS     REPRODUCTIVE ORGANS:  Unremarkable for patient's age      URINARY BLADDER:  Unremarkable      ABDOMINAL WALL/INGUINAL REGIONS:  Unremarkable      OSSEOUS STRUCTURES: Mild degenerative disc changes at L5-S1   No acute fracture or destructive osseous lesion      IMPRESSION:     Lobulated hypodensity in the left kidney measuring 1 6 x 1 0 cm does not represent simple cyst and ultrasound characterization recommended    No hydronephrosis      Sludge in the gallbladder suggested with wall thickening/ pericholecystic fluid   Ultrasound correlation as clinically indicated         Review of Systems      Review of Systems   Constitutional: Negative for activity change, appetite change, chills, fatigue, fever and unexpected weight change  HENT: Negative for facial swelling  Eyes: Negative for discharge  Respiratory: Negative  Negative for cough and shortness of breath  Cardiovascular: Negative for chest pain and leg swelling  Gastrointestinal: Negative  Negative for abdominal distention, abdominal pain, constipation, diarrhea, nausea and vomiting  Endocrine: Negative  Genitourinary: Positive for frequency and urgency  Negative for decreased urine volume, difficulty urinating, dysuria, enuresis, flank pain, genital sores, hematuria, scrotal swelling and testicular pain  Nocturia x 2 with urge incontinence   Musculoskeletal: Negative for back pain and myalgias  Skin: Negative for pallor and rash  Allergic/Immunologic: Negative  Negative for immunocompromised state  Neurological: Negative for facial asymmetry and speech difficulty  Psychiatric/Behavioral: Negative for agitation and confusion          Allergies      No Known Allergies     Physical Exam      Physical Exam  Vitals signs reviewed  Constitutional:       General: He is not in acute distress       Appearance: Normal appearance  He is normal weight  He is not ill-appearing, toxic-appearing or diaphoretic  HENT:      Head: Normocephalic and atraumatic  Eyes:      General: No scleral icterus  Neck:      Musculoskeletal: Normal range of motion  Cardiovascular:      Rate and Rhythm: Normal rate  Pulmonary:      Effort: Pulmonary effort is normal  No respiratory distress  Abdominal:      General: Abdomen is flat  There is no distension  Palpations: Abdomen is soft  Tenderness: There is no abdominal tenderness  There is no right CVA tenderness, left CVA tenderness, guarding or rebound  Genitourinary:     Penis: Circumcised  Lesions present  No hypospadias, erythema, tenderness or discharge  Scrotum/Testes:         Right: Tenderness or swelling not present  Left: Tenderness or swelling not present  Epididymis:      Right: No tenderness  Left: No tenderness  Musculoskeletal:         General: No swelling  Right lower leg: No edema  Left lower leg: No edema  Skin:     General: Skin is warm and dry  Coloration: Skin is not jaundiced or pale  Findings: No rash  Neurological:      General: No focal deficit present  Mental Status: He is alert and oriented to person, place, and time  Gait: Gait normal    Psychiatric:         Mood and Affect: Mood normal          Behavior: Behavior normal          Thought Content:  Thought content normal          Judgment: Judgment normal             Vital Signs          Vitals:     06/04/21 1012   BP: 124/70   Weight: 58 kg (127 lb 13 9 oz)   Height: 5' 10" (1 778 m)         Current Medications         Current Outpatient Medications:     mirtazapine (REMERON) 15 mg tablet, Take 7 5 mg by mouth daily at bedtime , Disp: , Rfl:     QUEtiapine (SEROquel) 50 mg tablet, Take 50 mg by mouth daily at bedtime Pt is to take 25 mg in the am and 100 mg hs, Disp: , Rfl:     ondansetron (ZOFRAN-ODT) 4 mg disintegrating tablet, Take 1 tablet (4 mg total) by mouth every 6 (six) hours as needed for nausea or vomiting (Patient not taking: Reported on 1/14/2021), Disp: 10 tablet, Rfl: 0    oxyCODONE-acetaminophen (PERCOCET) 5-325 mg per tablet, Take 1 tablet by mouth every 4 (four) hours as needed for moderate pain or severe pain for up to 8 dosesMax Daily Amount: 6 tablets (Patient not taking: Reported on 6/4/2021), Disp: 8 tablet, Rfl: 0    patient supplied medication, medical marijuana, Disp: , Rfl:      Active Problems          Patient Active Problem List   Diagnosis    Posttraumatic stress disorder    [facility]ipolar I disorder ([facility] )    Anxiety    Adult antisocial behavior    Adjustment disorder         Past Medical History           Past Medical History:   Diagnosis Date    Anxiety      Bipolar disorder ([facility] )           Surgical History            Past Surgical History:   Procedure Laterality Date    HERNIA REPAIR        MOLE EXCISION Left      MYRINGOTOMY W/ TUBES   1980    AL LAP,CHOLECYSTECTOMY N/A 5/24/2021     Procedure: CHOLECYSTECTOMY LAPAROSCOPIC;  Surgeon: Gabriella Valle MD;  Location: [address] [place] [address];  Service: General         Family History            Family History   Problem Relation Age of Onset    Aneurysm Mother      Arthritis Father           Social History      Social History      Social History           Tobacco Use   Smoking Status Current Every Day Smoker    Packs/day: 1 00    Types: Cigarettes   Smokeless Tobacco Never Used               Past Surgical History:   Procedure Laterality Date    HERNIA REPAIR        MOLE EXCISION Left      MYRINGOTOMY W/ TUBES   1980    AL LAP,CHOLECYSTECTOMY N/A 5/24/2021     Procedure: CHOLECYSTECTOMY LAPAROSCOPIC;  Surgeon:  Gabriella Valle MD;  Location: [address] [place] [address] OR;  Service: General            The following portions of the patient's history were reviewed and updated as appropriate: allergies, current medications, past family history, past medical history, past social history, past surgical history and problem list     Please note :  Voice dictation software has been used to create this document  Angel Cisse may be inadvertent transcription errors      MIGUELITO Salinas                  Cosigned by: Rodrigue Wang MD at 6/4/2021  3:11 PM   Revision History

## 2021-08-04 ENCOUNTER — HOSPITAL ENCOUNTER (OUTPATIENT)
Facility: HOSPITAL | Age: 41
Setting detail: OUTPATIENT SURGERY
Discharge: HOME | End: 2021-08-04
Attending: UROLOGY | Admitting: UROLOGY
Payer: COMMERCIAL

## 2021-08-04 ENCOUNTER — ANESTHESIA (OUTPATIENT)
Dept: PERIOP | Facility: HOSPITAL | Age: 41
End: 2021-08-04
Payer: COMMERCIAL

## 2021-08-04 VITALS
HEIGHT: 70 IN | TEMPERATURE: 98.9 F | RESPIRATION RATE: 16 BRPM | OXYGEN SATURATION: 99 % | HEART RATE: 70 BPM | SYSTOLIC BLOOD PRESSURE: 100 MMHG | DIASTOLIC BLOOD PRESSURE: 67 MMHG | WEIGHT: 120 LBS | BODY MASS INDEX: 17.18 KG/M2

## 2021-08-04 DIAGNOSIS — A63.0 CONDYLOMA ACUMINATA: Primary | ICD-10-CM

## 2021-08-04 PROBLEM — Z79.899 MEDICAL MARIJUANA USE: Status: ACTIVE | Noted: 2021-08-04

## 2021-08-04 PROCEDURE — NC001 PR NO CHARGE: Performed by: UROLOGY

## 2021-08-04 PROCEDURE — 54065 DESTRUCTION PENIS LESION(S): CPT | Performed by: UROLOGY

## 2021-08-04 RX ORDER — LIDOCAINE HYDROCHLORIDE 10 MG/ML
INJECTION, SOLUTION EPIDURAL; INFILTRATION; INTRACAUDAL; PERINEURAL AS NEEDED
Status: DISCONTINUED | OUTPATIENT
Start: 2021-08-04 | End: 2021-08-04 | Stop reason: HOSPADM

## 2021-08-04 RX ORDER — BUPIVACAINE HYDROCHLORIDE 5 MG/ML
INJECTION, SOLUTION PERINEURAL AS NEEDED
Status: DISCONTINUED | OUTPATIENT
Start: 2021-08-04 | End: 2021-08-04 | Stop reason: HOSPADM

## 2021-08-04 RX ORDER — HYDROCODONE BITARTRATE AND ACETAMINOPHEN 5; 325 MG/1; MG/1
1 TABLET ORAL EVERY 6 HOURS PRN
Status: DISCONTINUED | OUTPATIENT
Start: 2021-08-04 | End: 2021-08-04 | Stop reason: HOSPADM

## 2021-08-04 RX ORDER — HYDROCODONE BITARTRATE AND ACETAMINOPHEN 5; 325 MG/1; MG/1
1 TABLET ORAL EVERY 6 HOURS PRN
Qty: 5 TABLET | Refills: 0 | Status: SHIPPED | OUTPATIENT
Start: 2021-08-04 | End: 2021-09-29 | Stop reason: HOSPADM

## 2021-08-04 RX ORDER — CEFAZOLIN SODIUM 1 G/50ML
1000 SOLUTION INTRAVENOUS ONCE
Status: DISCONTINUED | OUTPATIENT
Start: 2021-08-04 | End: 2021-08-04 | Stop reason: HOSPADM

## 2021-08-04 RX ORDER — SODIUM CHLORIDE 9 MG/ML
125 INJECTION, SOLUTION INTRAVENOUS CONTINUOUS
Status: DISCONTINUED | OUTPATIENT
Start: 2021-08-04 | End: 2021-08-04 | Stop reason: HOSPADM

## 2021-08-04 NOTE — ANESTHESIA PREPROCEDURE EVALUATION
Procedure:  EXCISION CONDYLOMA PERINEAL (PENILE/VAGINAL) WITH LASER CO2 (N/A Perineum)    Relevant Problems   NEURO/PSYCH   (+) Anxiety   (+) Posttraumatic stress disorder      Other   (+) Adjustment disorder   (+) Adult antisocial behavior   (+) Bipolar I disorder (HCC)   (+) Medical marijuana use        Physical Exam    Airway       Dental       Cardiovascular  Rhythm: regular, Rate: normal, Cardiovascular exam normal    Pulmonary  Pulmonary exam normal Breath sounds clear to auscultation,     Other Findings        Anesthesia Plan  ASA Score- 2     Anesthesia Type- general with ASA Monitors  Additional Monitors:   Airway Plan: LMA  Plan Factors-    Chart reviewed  Patient summary reviewed  Patient is a current smoker  Patient instructed to abstain from smoking on day of procedure  Induction- intravenous  Postoperative Plan-     Informed Consent- Anesthetic plan and risks discussed with patient

## 2021-08-04 NOTE — DISCHARGE INSTRUCTIONS
Expect to see swelling and redness  Apply antibiotic ointment, over the counter, to area once daily  Scabs will fall off  May resume usual activities  Avoid sexual intercourse and have partner checked for condyloma at [facility] or by GYN

## 2021-08-04 NOTE — OP NOTE
OPERATIVE REPORT  PATIENT NAME: Dean Joya    :  1980  MRN: [id number]  Pt Location: [facility] OR ROOM 04    SURGERY DATE: 2021    Surgeon(s) and Role:     * Viri Rodriguez MD - Primary    Preop Diagnosis:  Condyloma Esten Fu  0]Of penile shaft, base of penis and scrotum    Post-Op Diagnosis Codes:     * Condyloma [A63 0] as above    Procedure(s) (LRB):  EXCISION CONDYLOMA PERINEAL (PENILE/VAGINAL) WITH LASER CO2 (N/A)    Specimen(s):  * No specimens in log *    Estimated Blood Loss:   Minimal    Drains:  * No LDAs found *    Anesthesia Type:   20 cc 0 5% plain bupivacaine and 5 cc 1% xylocaine given local    Operative Indications:  Condyloma [A63 0]      Operative Findings: At least 15 lesions ranging from 2 mm meters to 8 mm dorsal ventral and side of the penile shaft, 2 lesions left hemiscrotum, 3-4 lesions infrapubic region  All burned and desiccated with the CO2 laser  Complications:   None    Procedure and Technique:  26-year-old man with history of condyloma that has been frozen before when he was imprisoned has had recurrence  He believes he may been infected by his current sexual partner and I counseled him regarding having her checked  Also counseled regarding abstaining from intercourse with her  He has been offered CO2 laser ablation of the condyloma and the risks of bleeding infection and recurrence especially in areas where the condylomas not yet visible have been explained he gives informed consent  Poor cosmetic result as well  The patient was brought to the operating room and identified properly  Time-out was performed, and I gave local anesthetic as above under the lesions themselves  These were identified and some were marked with a marking pen because they were not hyperpigmented  Using the CO2 laser, I ablated each lesion  There was little no bleeding  After I was satisfied that all lesions had been ablated, I placed bacitracin ointment    The patient tolerated the procedure well    I was present for the entire procedure and A qualified resident physician was not available    Patient Disposition:  hemodynamically stable    SIGNATURE: Stephanie Hermosillo MD  DATE: August 4, 2021  TIME: 8:35 AM

## 2021-08-04 NOTE — PROGRESS NOTES
Dr Hina Nance at bedside, willing to do procedure with local anesthesia - OK with pt for local anesthesia, due to pt chewing gum & possible cancellation of surgery

## 2021-08-05 ENCOUNTER — NURSE TRIAGE (OUTPATIENT)
Dept: OTHER | Facility: OTHER | Age: 41
End: 2021-08-05

## 2021-08-05 ENCOUNTER — TELEPHONE (OUTPATIENT)
Dept: UROLOGY | Facility: CLINIC | Age: 41
End: 2021-08-05

## 2021-08-05 DIAGNOSIS — N50.89 SCROTAL SWELLING: Primary | ICD-10-CM

## 2021-08-05 NOTE — TELEPHONE ENCOUNTER
----- Message from Stefania Lua RN sent at 8/5/2021 10:37 AM EDT -----  Roseline Harden  Could you please schedule 2 month appointment for patient  If you need help please let me know    Thanks  Alvin Redmond  ----- Message -----  From: Viri Rodriguez MD  Sent: 8/4/2021  10:18 AM EDT  To: [facility]    Please have pt see me for a quick visit in 2 months to check for recurrence of condyloma

## 2021-08-06 ENCOUNTER — HOSPITAL ENCOUNTER (OUTPATIENT)
Dept: ULTRASOUND IMAGING | Facility: HOSPITAL | Age: 41
Discharge: HOME | End: 2021-08-06
Payer: COMMERCIAL

## 2021-08-06 DIAGNOSIS — N50.89 SCROTAL SWELLING: ICD-10-CM

## 2021-08-06 PROCEDURE — 76870 US EXAM SCROTUM: CPT

## 2021-08-12 ENCOUNTER — TELEPHONE (OUTPATIENT)
Dept: UROLOGY | Facility: AMBULATORY SURGERY CENTER | Age: 41
End: 2021-08-12

## 2021-08-12 NOTE — TELEPHONE ENCOUNTER
Radiology reading room calling with significant finding on [facility]  Patient managed by Dr Sarah Hawkins

## 2021-08-23 ENCOUNTER — TELEPHONE (OUTPATIENT)
Dept: UROLOGY | Facility: CLINIC | Age: 41
End: 2021-08-23

## 2021-08-23 NOTE — TELEPHONE ENCOUNTER
Patient's mother called regarding the letter he received over the weekend  She stated she is authorized on his medical and needs someone to advise the results of his ultrasound

## 2021-08-23 NOTE — TELEPHONE ENCOUNTER
Called and spoke with patients mother regarding letter  She is aware of US results and that no further intervention is needed at this time

## 2021-08-23 NOTE — TELEPHONE ENCOUNTER
The patient was called with these results on 08/12/2021  The results were unremarkable other than a cyst   And there were some postsurgical changes  There is no further intervention needed at this time

## 2021-09-01 ENCOUNTER — OFFICE VISIT (OUTPATIENT)
Dept: URGENT CARE | Facility: CLINIC | Age: 41
End: 2021-09-01
Payer: COMMERCIAL

## 2021-09-01 VITALS
TEMPERATURE: 98 F | SYSTOLIC BLOOD PRESSURE: 136 MMHG | WEIGHT: 125 LBS | RESPIRATION RATE: 18 BRPM | DIASTOLIC BLOOD PRESSURE: 77 MMHG | BODY MASS INDEX: 17.9 KG/M2 | OXYGEN SATURATION: 97 % | HEIGHT: 70 IN | HEART RATE: 84 BPM

## 2021-09-01 DIAGNOSIS — R22.0 LEFT FACIAL SWELLING: Primary | ICD-10-CM

## 2021-09-01 DIAGNOSIS — Z98.890 HISTORY OF RECENT DENTAL PROCEDURE: ICD-10-CM

## 2021-09-01 PROCEDURE — 99214 OFFICE O/P EST MOD 30 MIN: CPT | Performed by: NURSE PRACTITIONER

## 2021-09-01 RX ORDER — AMOXICILLIN 500 MG/1
500 CAPSULE ORAL EVERY 8 HOURS SCHEDULED
Qty: 21 CAPSULE | Refills: 0 | Status: SHIPPED | OUTPATIENT
Start: 2021-09-01 | End: 2021-09-08

## 2021-09-01 NOTE — PROGRESS NOTES
[address] Now        NAME: Susan Correa is a 38 y o  male  : 1980    MRN: [id number]  DATE: 2021  TIME: 4:32 PM    Assessment and Plan   Left facial swelling [R22 0]  1  Left facial swelling  amoxicillin (AMOXIL) 500 mg capsule   2  History of recent dental procedure  amoxicillin (AMOXIL) 500 mg capsule         Patient Instructions       Follow up with PCP in 3-5 days  Proceed to  ER if symptoms worsen  Amoxicillin prescribed for you - take as prescribed  Take tylenol and/or motrin for pain  Continue directions from the dentist office given to you today  Call your dentist if symptoms worsen  Follow up with your PCP  Go to the ED if symptoms worsen        Chief Complaint     Chief Complaint   Patient presents with    Dental Pain     left upper tooth removed today and was not given an antibiotic         History of Present Illness       This is a 38year old male who states had a #14 tooth removed at 10 am this morning at [facility] dental clinic and when he got home noted that the left side of his face is swollen  He had called them and he states he was informed that he needed an antibiotic and that he "was to return to the clinic for the antibiotic"  He states this is a 2 5 [address] drive and is unable to go there  He is here now with c/o facial swelling and pain  Dental Pain         Review of Systems   Review of Systems   Constitutional: Negative  HENT: Positive for dental problem  Eyes: Negative  Respiratory: Negative  Cardiovascular: Negative  Gastrointestinal: Negative  Endocrine: Negative  Genitourinary: Negative  Musculoskeletal: Negative  Skin: Negative  Allergic/Immunologic: Negative  Neurological: Negative  Hematological: Negative  Psychiatric/Behavioral: Negative            Current Medications       Current Outpatient Medications:     mirtazapine (REMERON) 15 mg tablet, Take 7 5 mg by mouth daily at bedtime , Disp: , Rfl:     patient supplied medication, medical marijuana , Disp: , Rfl:     QUEtiapine (SEROquel) 50 mg tablet, Take 50 mg by mouth daily at bedtime Pt is to take 25 mg in the am and 100 mg hs, Disp: , Rfl:     amoxicillin (AMOXIL) 500 mg capsule, Take 1 capsule (500 mg total) by mouth every 8 (eight) hours for 7 days, Disp: 21 capsule, Rfl: 0    HYDROcodone-acetaminophen (NORCO) 5-325 mg per tablet, Take 1 tablet by mouth every 6 (six) hours as needed for pain for up to 5 dosesMax Daily Amount: 4 tablets (Patient not taking: Reported on 9/1/2021), Disp: 5 tablet, Rfl: 0    Current Allergies     Allergies as of 09/01/2021    (No Known Allergies)            The following portions of the patient's history were reviewed and updated as appropriate: allergies, current medications, past family history, past medical history, past social history, past surgical history and problem list      Past Medical History:   Diagnosis Date    Anxiety     Bipolar disorder ([facility] )     Depression     Wears glasses        Past Surgical History:   Procedure Laterality Date    CHOLECYSTECTOMY      LASER ABLATION OF CONDYLOMAS N/A 8/4/2021    Procedure: EXCISION CONDYLOMA PERINEAL (PENILE) WITH LASER CO2;  Surgeon: Dominick Moe MD;  Location: [facility];  Service: Urology    MOLE EXCISION Left     leg    MYRINGOTOMY W/ TUBES  1980    VT LAP,CHOLECYSTECTOMY N/A 5/24/2021    Procedure: CHOLECYSTECTOMY LAPAROSCOPIC;  Surgeon: Princess Jeff MD;  Location: [address] [place] [address] MAIN OR;  Service: General       Family History   Problem Relation Age of Onset    Aneurysm Mother     Arthritis Father          Medications have been verified  Objective   /77   Pulse 84   Temp 98 °F (36 7 °C) (Temporal)   Resp 18   Ht 5' 10" (1 778 m)   Wt 56 7 kg (125 lb)   SpO2 97%   BMI 17 94 kg/m²   No LMP for male patient  Physical Exam     Physical Exam  Vitals and nursing note reviewed     Constitutional:       General: He is not in acute distress  Appearance: Normal appearance  He is normal weight  He is not ill-appearing, toxic-appearing or diaphoretic  HENT:      Head:        Mouth/Throat:      Mouth: Mucous membranes are moist      Eyes:      Extraocular Movements: Extraocular movements intact  Pupils: Pupils are equal, round, and reactive to light  Cardiovascular:      Rate and Rhythm: Normal rate  Pulmonary:      Effort: Pulmonary effort is normal    Musculoskeletal:         General: Normal range of motion  Cervical back: Normal range of motion  Skin:     General: Skin is warm and dry  Capillary Refill: Capillary refill takes less than 2 seconds  Neurological:      General: No focal deficit present  Mental Status: He is alert and oriented to person, place, and time  Psychiatric:         Mood and Affect: Mood normal          Behavior: Behavior normal          Thought Content: Thought content normal          Judgment: Judgment normal            Called [facility] and spoke with Mara regarding pt c/o  She states physicians are gone for the day  I have asked that the office do follow up call with pt tomorrow, that I would prescribe amoxicillin

## 2021-09-01 NOTE — PATIENT INSTRUCTIONS
Amoxicillin prescribed for you - take as prescribed  Take tylenol and/or motrin for pain  Continue directions from the dentist office given to you today    Call your dentist if symptoms worsen  Follow up with your PCP  Go to the ED if symptoms worsen

## 2021-09-16 ENCOUNTER — HOSPITAL ENCOUNTER (EMERGENCY)
Facility: HOSPITAL | Age: 41
Discharge: LEFT BEFORE DISCHARGE | End: 2021-09-17
Attending: EMERGENCY MEDICINE
Payer: COMMERCIAL

## 2021-09-16 VITALS
RESPIRATION RATE: 20 BRPM | SYSTOLIC BLOOD PRESSURE: 120 MMHG | TEMPERATURE: 97.9 F | OXYGEN SATURATION: 99 % | DIASTOLIC BLOOD PRESSURE: 72 MMHG | HEART RATE: 92 BPM

## 2021-09-16 DIAGNOSIS — Z00.8 ENCOUNTER FOR PSYCHOLOGICAL EVALUATION: Primary | ICD-10-CM

## 2021-09-16 LAB
AMPHETAMINES SERPL QL SCN: NEGATIVE
BARBITURATES UR QL: NEGATIVE
BENZODIAZ UR QL: NEGATIVE
COCAINE UR QL: NEGATIVE
ETHANOL EXG-MCNC: 0 MG/DL
METHADONE UR QL: NEGATIVE
OPIATES UR QL SCN: NEGATIVE
OXYCODONE+OXYMORPHONE UR QL SCN: NEGATIVE
PCP UR QL: NEGATIVE
SARS-COV-2 RNA RESP QL NAA+PROBE: NEGATIVE
THC UR QL: POSITIVE

## 2021-09-16 PROCEDURE — 99285 EMERGENCY DEPT VISIT HI MDM: CPT | Performed by: EMERGENCY MEDICINE

## 2021-09-16 PROCEDURE — U0005 INFEC AGEN DETEC AMPLI PROBE: HCPCS | Performed by: EMERGENCY MEDICINE

## 2021-09-16 PROCEDURE — 96372 THER/PROPH/DIAG INJ SC/IM: CPT

## 2021-09-16 PROCEDURE — U0003 INFECTIOUS AGENT DETECTION BY NUCLEIC ACID (DNA OR RNA); SEVERE ACUTE RESPIRATORY SYNDROME CORONAVIRUS 2 (SARS-COV-2) (CORONAVIRUS DISEASE [COVID-19]), AMPLIFIED PROBE TECHNIQUE, MAKING USE OF HIGH THROUGHPUT TECHNOLOGIES AS DESCRIBED BY CMS-2020-01-R: HCPCS | Performed by: EMERGENCY MEDICINE

## 2021-09-16 PROCEDURE — 99284 EMERGENCY DEPT VISIT MOD MDM: CPT

## 2021-09-16 PROCEDURE — 82075 ASSAY OF BREATH ETHANOL: CPT | Performed by: EMERGENCY MEDICINE

## 2021-09-16 PROCEDURE — 80307 DRUG TEST PRSMV CHEM ANLYZR: CPT | Performed by: EMERGENCY MEDICINE

## 2021-09-16 RX ORDER — LORAZEPAM 2 MG/ML
2 INJECTION INTRAMUSCULAR ONCE
Status: COMPLETED | OUTPATIENT
Start: 2021-09-16 | End: 2021-09-16

## 2021-09-16 RX ORDER — HALOPERIDOL 5 MG/ML
5 INJECTION INTRAMUSCULAR ONCE
Status: DISCONTINUED | OUTPATIENT
Start: 2021-09-16 | End: 2021-09-17 | Stop reason: HOSPADM

## 2021-09-16 RX ADMIN — LORAZEPAM 2 MG: 2 INJECTION INTRAMUSCULAR; INTRAVENOUS at 20:30

## 2021-09-17 NOTE — ED NOTES
Pt woke and was angry that he was still here in the ER  I tried to tell him that a bed search was done and there was no beds available  Pt angry that he didn't have a bed and that we should call the police because he was going to "bust out of here or tear this place up"       Paul Mary  09/17/21 [id number]

## 2021-09-17 NOTE — ED PROVIDER NOTES
History  Chief Complaint   Patient presents with    Psychiatric Evaluation     pt brought in by police  per  pt stated to family that he wants to go on a shooting spree and then get killed by the police officers  This is a he 74-year-old man who is brought in for 302 evaluation  Patient's family reports that he was threatening to kill multiple predators across state lines until police are able to kill him in a suicide by  effort  They state that he has been using methamphetamine and has not been compliant with his medications  They became concerned by his retic behavior and call police  Please say that he was acting extremely aggressive and was nearly taste, especially when he picked up a rake and was threatening to attack with a rake  Patient states the only drug he has been using is marijuana  He does note that he left work earlier today due to some anger which she requested time off from his boss  He states he has not wanted to hurt anyone else or himself  It is somewhat hard interact with him and ask questions because of his anger  Prior to Admission Medications   Prescriptions Last Dose Informant Patient Reported? Taking?    HYDROcodone-acetaminophen (NORCO) 5-325 mg per tablet   No No   Sig: Take 1 tablet by mouth every 6 (six) hours as needed for pain for up to 5 dosesMax Daily Amount: 4 tablets   Patient not taking: Reported on 9/1/2021   QUEtiapine (SEROquel) 50 mg tablet  Self Yes No   Sig: Take 50 mg by mouth daily at bedtime Pt is to take 25 mg in the am and 100 mg hs   mirtazapine (REMERON) 15 mg tablet   Yes No   Sig: Take 7 5 mg by mouth daily at bedtime    patient supplied medication  Self Yes No   Sig: medical marijuana       Facility-Administered Medications: None       Past Medical History:   Diagnosis Date    Anxiety     Bipolar disorder ([facility] [address] )     Depression     Wears glasses        Past Surgical History:   Procedure Laterality Date    CHOLECYSTECTOMY  LASER ABLATION OF CONDYLOMAS N/A 8/4/2021    Procedure: EXCISION CONDYLOMA PERINEAL (PENILE) WITH LASER CO2;  Surgeon: Isis Oquendo MD;  Location: [facility];  Service: Urology    MOLE EXCISION Left     leg    MYRINGOTOMY W/ TUBES  1980    FL LAP,CHOLECYSTECTOMY N/A 5/24/2021    Procedure: CHOLECYSTECTOMY LAPAROSCOPIC;  Surgeon: Zelda Villafana MD;  Location: [address] [place] [address] MAIN OR;  Service: General       Family History   Problem Relation Age of Onset    Aneurysm Mother     Arthritis Father      I have reviewed and agree with the history as documented  E-Cigarette/Vaping    E-Cigarette Use Current Every Day User     Comments Medical marijuana      E-Cigarette/Vaping Substances    Nicotine No     THC Yes     CBD Yes     Flavoring No     Other No     Unknown No      Social History     Tobacco Use    Smoking status: Current Every Day Smoker     Packs/day: 1 00     Types: Cigarettes    Smokeless tobacco: Never Used   Vaping Use    Vaping Use: Every day    Substances: THC, CBD   Substance Use Topics    Alcohol use: Never    Drug use: Yes     Types: Marijuana, Methamphetamines     Comment: medical        Review of Systems   Constitutional: Negative for chills and fever  Eyes: Negative for visual disturbance  Respiratory: Negative for shortness of breath  Cardiovascular: Negative for chest pain  Gastrointestinal: Negative for abdominal distention and abdominal pain  Endocrine: Negative for polyuria  Genitourinary: Negative for decreased urine volume and dysuria  Skin: Negative for rash  Neurological: Positive for headaches  Negative for dizziness, syncope and weakness  Physical Exam  Physical Exam  Constitutional:       General: He is not in acute distress  Appearance: He is well-developed  Comments: Very little body fat   HENT:      Head: Normocephalic and atraumatic        Right Ear: External ear normal       Left Ear: External ear normal       Nose: Nose normal  Mouth/Throat:      Mouth: Mucous membranes are moist    Eyes:      Conjunctiva/sclera: Conjunctivae normal       Pupils: Pupils are equal, round, and reactive to light  Cardiovascular:      Rate and Rhythm: Normal rate and regular rhythm  Heart sounds: Normal heart sounds  Pulmonary:      Effort: Pulmonary effort is normal  No respiratory distress  Breath sounds: Normal breath sounds  Abdominal:      General: Bowel sounds are normal  There is no distension  Palpations: Abdomen is soft  Tenderness: There is no abdominal tenderness  There is no guarding or rebound  Musculoskeletal:         General: Normal range of motion  Cervical back: Normal range of motion and neck supple  Skin:     General: Skin is warm and dry  Neurological:      Mental Status: He is alert and oriented to person, place, and time  Psychiatric:      Comments: Labile, aggressive, partially redirectable         Vital Signs  ED Triage Vitals [09/16/21 1924]   Temperature Pulse Respirations Blood Pressure SpO2   97 9 °F (36 6 °C) 92 20 120/72 99 %      Temp Source Heart Rate Source Patient Position - Orthostatic VS BP Location FiO2 (%)   Tympanic Monitor Sitting Left arm --      Pain Score       --           Vitals:    09/16/21 1924   BP: 120/72   Pulse: 92   Patient Position - Orthostatic VS: Sitting         Visual Acuity      ED Medications  Medications   haloperidol lactate (HALDOL) injection 5 mg (has no administration in time range)   LORazepam (ATIVAN) injection 2 mg (2 mg Intramuscular Given 9/16/21 2030)       Diagnostic Studies  Results Reviewed     Procedure Component Value Units Date/Time    Novel Coronavirus [facility] [[id number]]  (Normal) Collected: 09/16/21 1921    Lab Status: Final result Specimen: Nares from Nose Updated: 09/16/21 2022     SARS-CoV-2 Negative    Narrative:       The specimen collection materials, transport medium, and/or testing methodology utilized in the production of these test results have been proven to be reliable in a limited validation with an abbreviated program under the Emergency Utilization Authorization provided by the FDA  Testing reported as "Presumptive positive" will be confirmed with secondary testing to ensure result accuracy  Clinical caution and judgement should be used with the interpretation of these results with consideration of the clinical impression and other laboratory testing  Testing reported as "Positive" or "Negative" has been proven to be accurate according to standard laboratory validation requirements  All testing is performed with control materials showing appropriate reactivity at standard intervals  Rapid drug screen, urine [[id number]]  (Abnormal) Collected: 09/16/21 1958    Lab Status: Final result Specimen: Urine, Other Updated: 09/16/21 2016     Amph/Meth UR Negative     Barbiturate Ur Negative     Benzodiazepine Urine Negative     Cocaine Urine Negative     Methadone Urine Negative     Opiate Urine Negative     PCP Ur Negative     THC Urine Positive     Oxycodone Urine Negative    Narrative:      Presumptive report  If requested, specimen will be sent to reference lab for confirmation  FOR MEDICAL PURPOSES ONLY  IF CONFIRMATION NEEDED PLEASE CONTACT THE LAB WITHIN 5 DAYS  Drug Screen Cutoff Levels:  AMPHETAMINE/METHAMPHETAMINES  1000 ng/mL  BARBITURATES     200 ng/mL  BENZODIAZEPINES     200 ng/mL  COCAINE      300 ng/mL  METHADONE      300 ng/mL  OPIATES      300 ng/mL  PHENCYCLIDINE     25 ng/mL  THC       50 ng/mL  OXYCODONE      100 ng/mL    POCT alcohol breath test [[id number]]  (Normal) Resulted: 09/16/21 1926    Lab Status: Final result Updated: 09/16/21 1926     EXTBreath Alcohol 0 00                 No orders to display              Procedures  Procedures         ED Course  ED Course as of Sep 16 2134   Thu Sep 16, 2021   1935 Mental health advocate/ sister called  "He is undermedicated right now  He is not taking seroquil during the day  His plan has been to kill sex offenders until the  shoot him dead  He is on the secret service watch list because he has sent letters to the president " They strongly suspect meth usage  MDM  Number of Diagnoses or Management Options  Encounter for psychological evaluation: new and requires workup  Diagnosis management comments: This is a 25-year-old man who is brought in for encounter for psychological evaluation  He signed a 201 voluntary admission  Patient is extremely aggressive concerning for his behaviors although he directly denies that he made the statements attributed to him by his many family members  Patient awaiting placement at the time of sign-out  Amount and/or Complexity of Data Reviewed  Clinical lab tests: ordered and reviewed        Disposition  Final diagnoses:   Encounter for psychological evaluation     Time reflects when diagnosis was documented in both MDM as applicable and the Disposition within this note     Time User Action Codes Description Comment    9/16/2021  8:18 PM [facility] Add [Z00 8] Encounter for psychological evaluation       ED Disposition     ED Disposition Condition Date/Time Comment    Transfer to [facility] Sep 16, 2021  8:19 PM Tavares Domingo should be transferred out to  and has been medically cleared  MD Documentation      Most Recent Value   Sending MD  Dr Ben Mendoza MD      Follow-up Information    None         Patient's Medications   Discharge Prescriptions    No medications on file     No discharge procedures on file      PDMP Review       Value Time User    PDMP Reviewed  Yes 8/4/2021  8:36 AM Rosario Brennan MD          ED Provider  Electronically Signed by           Arpita Hobson MD  09/16/21 9878

## 2021-09-17 NOTE — ED NOTES
Patient advised CW that he would pull the sprinklers if he wasn't in inpatient treatment in 2 hours

## 2021-09-17 NOTE — ED NOTES
Vital signs not obtained at this time as patient is sleeping, RN will continue to monitor        Jocelyne Palmer RN  09/17/21 9441

## 2021-09-17 NOTE — ED NOTES
Per [facility], patient denied due to there being no acute beds tonight or through discharges tomorrow

## 2021-09-17 NOTE — ED NOTES
Bed Search    [place]:  No acute beds per [facility]  [place]:  No beds per Jessica Realen:  No male beds per Perri Gautam:  No beds per Maldonado Backer:  Fax clinical per Deanna Saini  [facility]: Only have dual beds per [facility]:  No beds per Frank Escobar  [facility]:  No beds per [facility]:  No beds per [facility]:  No beds per [facility]:  No beds per Quin Goldberg:  No answer  [facility]:  Fax clinical per Robert Silverman

## 2021-09-17 NOTE — ED NOTES
Vital signs not obtained at this time as patient is sleeping  RN will continue to monitor        Jocelyne Palmer RN  09/16/21 2415

## 2021-09-17 NOTE — ED NOTES
Met with patient to complete the crisis intake assessment and safety risk assessment  Patient was escorted by the state police to the ER on a 100 petitioned by the patient's nephew  Petitioner reported that the patient stated that he would take the guns and go on a killing spree and that he would also kill himself  Patient was labile and at times assumed threatening postures  He was anxious and easily distracted  Patient denied suicidal and homicidal ideations and reported that there were no guns in the house as it would be a violation of his parole  Patient reported spending 20 years in [employer] for theft and was released in May, 2021  He reports currently being on parole until June 2023  Patient reported using THC but no other drugs  USD confirmed patient's report  Patient lives in a camper outside to his parent's home but regularly uses their house  He reported an argument with his nephew and that his sister became involved in defending her son  Patient displayed increased anxiousness when talking about his nephew but denied being angry at him  Patient made a statement that when he gets home "there's going to be probs "  Patient reported that he had to take half a day off of work because he was angry  Patient was advised that the 302 would be upheld unless he wanted to sign a 201  Patient agreed to sign the 201 but inserted his own comments on it  CW advised the patient that he could not alter the document  Patient became argumentative and stated he wouldn't sign it as is  CW advised that the 302 would be upheld and the patient asked for another 201 to sign  He was explained his rights and verbalized an understanding of those rights  Bed search in progress

## 2021-09-20 ENCOUNTER — HOSPITAL ENCOUNTER (INPATIENT)
Facility: HOSPITAL | Age: 41
LOS: 3 days | Discharge: HOME | DRG: 753 | End: 2021-09-29
Attending: EMERGENCY MEDICINE | Admitting: PSYCHIATRY & NEUROLOGY
Payer: COMMERCIAL

## 2021-09-20 DIAGNOSIS — Z04.6 INVOLUNTARY COMMITMENT: ICD-10-CM

## 2021-09-20 DIAGNOSIS — F29 PSYCHOSES (HCC): ICD-10-CM

## 2021-09-20 DIAGNOSIS — F31.9 BIPOLAR I DISORDER (HCC): Primary | ICD-10-CM

## 2021-09-20 DIAGNOSIS — R45.850 HOMICIDAL BEHAVIOR: ICD-10-CM

## 2021-09-20 PROCEDURE — 82075 ASSAY OF BREATH ETHANOL: CPT | Performed by: EMERGENCY MEDICINE

## 2021-09-20 PROCEDURE — 80307 DRUG TEST PRSMV CHEM ANLYZR: CPT | Performed by: EMERGENCY MEDICINE

## 2021-09-20 PROCEDURE — 99285 EMERGENCY DEPT VISIT HI MDM: CPT

## 2021-09-20 PROCEDURE — 99291 CRITICAL CARE FIRST HOUR: CPT | Performed by: EMERGENCY MEDICINE

## 2021-09-20 PROCEDURE — U0005 INFEC AGEN DETEC AMPLI PROBE: HCPCS | Performed by: EMERGENCY MEDICINE

## 2021-09-20 PROCEDURE — 96372 THER/PROPH/DIAG INJ SC/IM: CPT

## 2021-09-20 PROCEDURE — U0003 INFECTIOUS AGENT DETECTION BY NUCLEIC ACID (DNA OR RNA); SEVERE ACUTE RESPIRATORY SYNDROME CORONAVIRUS 2 (SARS-COV-2) (CORONAVIRUS DISEASE [COVID-19]), AMPLIFIED PROBE TECHNIQUE, MAKING USE OF HIGH THROUGHPUT TECHNOLOGIES AS DESCRIBED BY CMS-2020-01-R: HCPCS | Performed by: EMERGENCY MEDICINE

## 2021-09-20 RX ORDER — QUETIAPINE FUMARATE 50 MG/1
50 TABLET, FILM COATED ORAL
Status: DISCONTINUED | OUTPATIENT
Start: 2021-09-20 | End: 2021-09-29 | Stop reason: HOSPADM

## 2021-09-20 RX ORDER — MIDAZOLAM HYDROCHLORIDE 2 MG/2ML
5 INJECTION, SOLUTION INTRAMUSCULAR; INTRAVENOUS ONCE
Status: COMPLETED | OUTPATIENT
Start: 2021-09-20 | End: 2021-09-20

## 2021-09-20 RX ORDER — MIDAZOLAM HYDROCHLORIDE 2 MG/2ML
2.5 INJECTION, SOLUTION INTRAMUSCULAR; INTRAVENOUS ONCE
Status: DISCONTINUED | OUTPATIENT
Start: 2021-09-20 | End: 2021-09-20

## 2021-09-20 RX ORDER — MIRTAZAPINE 15 MG/1
7.5 TABLET, FILM COATED ORAL
Status: DISCONTINUED | OUTPATIENT
Start: 2021-09-20 | End: 2021-09-29 | Stop reason: HOSPADM

## 2021-09-20 RX ORDER — LORAZEPAM 1 MG/1
2 TABLET ORAL ONCE
Status: DISCONTINUED | OUTPATIENT
Start: 2021-09-20 | End: 2021-09-20

## 2021-09-20 RX ADMIN — MIDAZOLAM HYDROCHLORIDE 5 MG: 1 INJECTION, SOLUTION INTRAMUSCULAR; INTRAVENOUS at 20:18

## 2021-09-21 PROCEDURE — 96374 THER/PROPH/DIAG INJ IV PUSH: CPT

## 2021-09-21 PROCEDURE — 96372 THER/PROPH/DIAG INJ SC/IM: CPT

## 2021-09-21 PROCEDURE — NC001 PR NO CHARGE: Performed by: EMERGENCY MEDICINE

## 2021-09-21 RX ORDER — DIPHENHYDRAMINE HYDROCHLORIDE 50 MG/ML
50 INJECTION INTRAMUSCULAR; INTRAVENOUS ONCE
Status: COMPLETED | OUTPATIENT
Start: 2021-09-21 | End: 2021-09-21

## 2021-09-21 RX ORDER — LORAZEPAM 2 MG/ML
2 INJECTION INTRAMUSCULAR ONCE
Status: COMPLETED | OUTPATIENT
Start: 2021-09-21 | End: 2021-09-21

## 2021-09-21 RX ORDER — KETAMINE HYDROCHLORIDE 50 MG/ML
4 INJECTION, SOLUTION, CONCENTRATE INTRAMUSCULAR; INTRAVENOUS ONCE
Status: COMPLETED | OUTPATIENT
Start: 2021-09-21 | End: 2021-09-21

## 2021-09-21 RX ORDER — KETAMINE HYDROCHLORIDE 50 MG/ML
5 INJECTION, SOLUTION, CONCENTRATE INTRAMUSCULAR; INTRAVENOUS ONCE
Status: COMPLETED | OUTPATIENT
Start: 2021-09-21 | End: 2021-09-21

## 2021-09-21 RX ORDER — OLANZAPINE 10 MG/1
10 INJECTION, POWDER, LYOPHILIZED, FOR SOLUTION INTRAMUSCULAR ONCE
Status: COMPLETED | OUTPATIENT
Start: 2021-09-21 | End: 2021-09-21

## 2021-09-21 RX ORDER — ZIPRASIDONE MESYLATE 20 MG/ML
20 INJECTION, POWDER, LYOPHILIZED, FOR SOLUTION INTRAMUSCULAR ONCE
Status: COMPLETED | OUTPATIENT
Start: 2021-09-21 | End: 2021-09-21

## 2021-09-21 RX ADMIN — KETAMINE HYDROCHLORIDE 295 MG: 50 INJECTION, SOLUTION INTRAMUSCULAR; INTRAVENOUS at 18:02

## 2021-09-21 RX ADMIN — LORAZEPAM 2 MG: 2 INJECTION INTRAMUSCULAR; INTRAVENOUS at 23:17

## 2021-09-21 RX ADMIN — DIPHENHYDRAMINE HYDROCHLORIDE 50 MG: 50 INJECTION INTRAMUSCULAR; INTRAVENOUS at 11:42

## 2021-09-21 RX ADMIN — LORAZEPAM 2 MG: 2 INJECTION INTRAMUSCULAR; INTRAVENOUS at 11:42

## 2021-09-21 RX ADMIN — ZIPRASIDONE MESYLATE 20 MG: 20 INJECTION, POWDER, LYOPHILIZED, FOR SOLUTION INTRAMUSCULAR at 11:42

## 2021-09-21 RX ADMIN — KETAMINE HYDROCHLORIDE 235 MG: 50 INJECTION INTRAMUSCULAR; INTRAVENOUS at 06:56

## 2021-09-21 RX ADMIN — OLANZAPINE 10 MG: 10 INJECTION, POWDER, LYOPHILIZED, FOR SOLUTION INTRAMUSCULAR at 23:16

## 2021-09-21 RX ADMIN — LORAZEPAM 2 MG: 2 INJECTION INTRAMUSCULAR; INTRAVENOUS at 18:03

## 2021-09-22 PROCEDURE — 99243 OFF/OP CNSLTJ NEW/EST LOW 30: CPT | Performed by: PSYCHIATRY & NEUROLOGY

## 2021-09-22 RX ORDER — VALPROIC ACID 250 MG/1
500 CAPSULE, LIQUID FILLED ORAL EVERY 12 HOURS SCHEDULED
Status: DISCONTINUED | OUTPATIENT
Start: 2021-09-22 | End: 2021-09-29 | Stop reason: HOSPADM

## 2021-09-22 RX ORDER — ZIPRASIDONE MESYLATE 20 MG/ML
20 INJECTION, POWDER, LYOPHILIZED, FOR SOLUTION INTRAMUSCULAR ONCE
Status: COMPLETED | OUTPATIENT
Start: 2021-09-22 | End: 2021-09-23

## 2021-09-22 RX ORDER — DIPHENHYDRAMINE HYDROCHLORIDE 50 MG/ML
50 INJECTION INTRAMUSCULAR; INTRAVENOUS ONCE
Status: DISCONTINUED | OUTPATIENT
Start: 2021-09-22 | End: 2021-09-27

## 2021-09-22 RX ORDER — QUETIAPINE FUMARATE 100 MG/1
100 TABLET, FILM COATED ORAL
Status: DISCONTINUED | OUTPATIENT
Start: 2021-09-22 | End: 2021-09-25

## 2021-09-22 RX ORDER — ACETAMINOPHEN 325 MG/1
975 TABLET ORAL ONCE
Status: COMPLETED | OUTPATIENT
Start: 2021-09-22 | End: 2021-09-22

## 2021-09-22 RX ORDER — QUETIAPINE FUMARATE 50 MG/1
50 TABLET, FILM COATED ORAL
Status: DISCONTINUED | OUTPATIENT
Start: 2021-09-23 | End: 2021-09-29 | Stop reason: HOSPADM

## 2021-09-22 RX ORDER — LORAZEPAM 2 MG/ML
2 INJECTION INTRAMUSCULAR ONCE
Status: DISCONTINUED | OUTPATIENT
Start: 2021-09-22 | End: 2021-09-27

## 2021-09-22 RX ORDER — HALOPERIDOL 5 MG/ML
5 INJECTION INTRAMUSCULAR ONCE
Status: DISCONTINUED | OUTPATIENT
Start: 2021-09-22 | End: 2021-09-27

## 2021-09-22 RX ADMIN — ACETAMINOPHEN 975 MG: 325 TABLET ORAL at 06:12

## 2021-09-22 RX ADMIN — QUETIAPINE FUMARATE 100 MG: 100 TABLET ORAL at 22:25

## 2021-09-22 RX ADMIN — MIRTAZAPINE 7.5 MG: 15 TABLET, FILM COATED ORAL at 22:25

## 2021-09-23 PROCEDURE — 96372 THER/PROPH/DIAG INJ SC/IM: CPT

## 2021-09-23 RX ORDER — ACETAMINOPHEN 325 MG/1
975 TABLET ORAL ONCE
Status: COMPLETED | OUTPATIENT
Start: 2021-09-23 | End: 2021-09-23

## 2021-09-23 RX ORDER — OLANZAPINE 10 MG/1
10 INJECTION, POWDER, LYOPHILIZED, FOR SOLUTION INTRAMUSCULAR ONCE
Status: COMPLETED | OUTPATIENT
Start: 2021-09-23 | End: 2021-09-23

## 2021-09-23 RX ORDER — MIDAZOLAM HYDROCHLORIDE 2 MG/2ML
2 INJECTION, SOLUTION INTRAMUSCULAR; INTRAVENOUS ONCE
Status: COMPLETED | OUTPATIENT
Start: 2021-09-23 | End: 2021-09-23

## 2021-09-23 RX ADMIN — QUETIAPINE FUMARATE 50 MG: 50 TABLET ORAL at 08:08

## 2021-09-23 RX ADMIN — ACETAMINOPHEN 975 MG: 325 TABLET ORAL at 07:58

## 2021-09-23 RX ADMIN — MIRTAZAPINE 7.5 MG: 15 TABLET, FILM COATED ORAL at 21:14

## 2021-09-23 RX ADMIN — MIDAZOLAM HYDROCHLORIDE 2 MG: 1 INJECTION, SOLUTION INTRAMUSCULAR; INTRAVENOUS at 14:57

## 2021-09-23 RX ADMIN — QUETIAPINE FUMARATE 50 MG: 50 TABLET ORAL at 21:15

## 2021-09-23 RX ADMIN — VALPROIC ACID 500 MG: 250 CAPSULE ORAL at 21:13

## 2021-09-23 RX ADMIN — OLANZAPINE 10 MG: 10 INJECTION, POWDER, LYOPHILIZED, FOR SOLUTION INTRAMUSCULAR at 14:58

## 2021-09-23 RX ADMIN — ZIPRASIDONE MESYLATE 20 MG: 20 INJECTION, POWDER, LYOPHILIZED, FOR SOLUTION INTRAMUSCULAR at 13:53

## 2021-09-23 RX ADMIN — QUETIAPINE FUMARATE 100 MG: 100 TABLET ORAL at 21:15

## 2021-09-23 RX ADMIN — DIPHENHYDRAMINE HYDROCHLORIDE 50 MG: 25 LIQUID ORAL at 23:34

## 2021-09-24 PROCEDURE — 99232 SBSQ HOSP IP/OBS MODERATE 35: CPT | Performed by: HOSPITALIST

## 2021-09-24 RX ORDER — ZOLPIDEM TARTRATE 5 MG/1
10 TABLET ORAL
Status: DISCONTINUED | OUTPATIENT
Start: 2021-09-24 | End: 2021-09-29 | Stop reason: HOSPADM

## 2021-09-24 RX ADMIN — VALPROIC ACID 500 MG: 250 CAPSULE ORAL at 09:00

## 2021-09-24 RX ADMIN — VALPROIC ACID 500 MG: 250 CAPSULE ORAL at 22:40

## 2021-09-24 RX ADMIN — QUETIAPINE FUMARATE 50 MG: 50 TABLET ORAL at 10:31

## 2021-09-24 RX ADMIN — QUETIAPINE FUMARATE 100 MG: 100 TABLET ORAL at 22:40

## 2021-09-24 RX ADMIN — MIRTAZAPINE 7.5 MG: 15 TABLET, FILM COATED ORAL at 22:40

## 2021-09-24 RX ADMIN — ZOLPIDEM TARTRATE 10 MG: 5 TABLET, FILM COATED ORAL at 00:52

## 2021-09-25 PROCEDURE — 99232 SBSQ HOSP IP/OBS MODERATE 35: CPT | Performed by: PSYCHIATRY & NEUROLOGY

## 2021-09-25 RX ORDER — QUETIAPINE FUMARATE 200 MG/1
200 TABLET, FILM COATED ORAL
Status: DISCONTINUED | OUTPATIENT
Start: 2021-09-25 | End: 2021-09-29 | Stop reason: HOSPADM

## 2021-09-25 RX ADMIN — MIRTAZAPINE 7.5 MG: 15 TABLET, FILM COATED ORAL at 21:58

## 2021-09-25 RX ADMIN — VALPROIC ACID 500 MG: 250 CAPSULE ORAL at 21:56

## 2021-09-25 RX ADMIN — QUETIAPINE FUMARATE 50 MG: 50 TABLET ORAL at 07:31

## 2021-09-25 RX ADMIN — QUETIAPINE FUMARATE 200 MG: 200 TABLET ORAL at 21:57

## 2021-09-25 RX ADMIN — QUETIAPINE FUMARATE 50 MG: 50 TABLET ORAL at 21:57

## 2021-09-26 LAB — VALPROATE SERPL-MCNC: 48.6 UG/ML (ref 50–125)

## 2021-09-26 PROCEDURE — NC001 PR NO CHARGE: Performed by: PSYCHIATRY & NEUROLOGY

## 2021-09-26 PROCEDURE — 99232 SBSQ HOSP IP/OBS MODERATE 35: CPT | Performed by: PSYCHIATRY & NEUROLOGY

## 2021-09-26 PROCEDURE — 80164 ASSAY DIPROPYLACETIC ACD TOT: CPT | Performed by: PSYCHIATRY & NEUROLOGY

## 2021-09-26 PROCEDURE — 36415 COLL VENOUS BLD VENIPUNCTURE: CPT | Performed by: PSYCHIATRY & NEUROLOGY

## 2021-09-26 RX ORDER — ACETAMINOPHEN 325 MG/1
650 TABLET ORAL EVERY 6 HOURS PRN
Status: DISCONTINUED | OUTPATIENT
Start: 2021-09-26 | End: 2021-09-27 | Stop reason: SDUPTHER

## 2021-09-26 RX ADMIN — QUETIAPINE FUMARATE 50 MG: 50 TABLET ORAL at 21:29

## 2021-09-26 RX ADMIN — VALPROIC ACID 500 MG: 250 CAPSULE ORAL at 08:05

## 2021-09-26 RX ADMIN — ACETAMINOPHEN 650 MG: 325 TABLET ORAL at 20:03

## 2021-09-26 RX ADMIN — VALPROIC ACID 500 MG: 250 CAPSULE ORAL at 20:03

## 2021-09-26 RX ADMIN — QUETIAPINE FUMARATE 50 MG: 50 TABLET ORAL at 08:05

## 2021-09-26 RX ADMIN — QUETIAPINE FUMARATE 200 MG: 200 TABLET ORAL at 21:28

## 2021-09-26 RX ADMIN — MIRTAZAPINE 7.5 MG: 15 TABLET, FILM COATED ORAL at 21:27

## 2021-09-27 ENCOUNTER — APPOINTMENT (INPATIENT)
Dept: RADIOLOGY | Facility: HOSPITAL | Age: 41
DRG: 753 | End: 2021-09-27
Payer: COMMERCIAL

## 2021-09-27 PROBLEM — Z00.8 MEDICAL CLEARANCE FOR PSYCHIATRIC ADMISSION: Status: ACTIVE | Noted: 2021-09-27

## 2021-09-27 PROCEDURE — 73120 X-RAY EXAM OF HAND: CPT

## 2021-09-27 PROCEDURE — 99252 IP/OBS CONSLTJ NEW/EST SF 35: CPT | Performed by: INTERNAL MEDICINE

## 2021-09-27 PROCEDURE — 99232 SBSQ HOSP IP/OBS MODERATE 35: CPT | Performed by: NURSE PRACTITIONER

## 2021-09-27 RX ORDER — HYDROXYZINE HYDROCHLORIDE 25 MG/1
25 TABLET, FILM COATED ORAL
Status: DISCONTINUED | OUTPATIENT
Start: 2021-09-27 | End: 2021-09-29 | Stop reason: HOSPADM

## 2021-09-27 RX ORDER — ACETAMINOPHEN 325 MG/1
650 TABLET ORAL EVERY 6 HOURS PRN
Status: DISCONTINUED | OUTPATIENT
Start: 2021-09-27 | End: 2021-09-29 | Stop reason: HOSPADM

## 2021-09-27 RX ORDER — HALOPERIDOL 5 MG
5 TABLET ORAL
Status: DISCONTINUED | OUTPATIENT
Start: 2021-09-27 | End: 2021-09-29 | Stop reason: HOSPADM

## 2021-09-27 RX ORDER — LORAZEPAM 2 MG/ML
2 INJECTION INTRAMUSCULAR
Status: DISCONTINUED | OUTPATIENT
Start: 2021-09-27 | End: 2021-09-29 | Stop reason: HOSPADM

## 2021-09-27 RX ORDER — LORAZEPAM 2 MG/ML
2 INJECTION INTRAMUSCULAR EVERY 6 HOURS PRN
Status: DISCONTINUED | OUTPATIENT
Start: 2021-09-27 | End: 2021-09-29 | Stop reason: HOSPADM

## 2021-09-27 RX ORDER — HALOPERIDOL 5 MG/ML
5 INJECTION INTRAMUSCULAR
Status: DISCONTINUED | OUTPATIENT
Start: 2021-09-27 | End: 2021-09-29 | Stop reason: HOSPADM

## 2021-09-27 RX ORDER — HYDROXYZINE HYDROCHLORIDE 25 MG/1
100 TABLET, FILM COATED ORAL
Status: DISCONTINUED | OUTPATIENT
Start: 2021-09-27 | End: 2021-09-29 | Stop reason: HOSPADM

## 2021-09-27 RX ORDER — ACETAMINOPHEN 325 MG/1
650 TABLET ORAL EVERY 4 HOURS PRN
Status: DISCONTINUED | OUTPATIENT
Start: 2021-09-27 | End: 2021-09-29 | Stop reason: HOSPADM

## 2021-09-27 RX ORDER — BENZTROPINE MESYLATE 1 MG/ML
0.5 INJECTION INTRAMUSCULAR; INTRAVENOUS
Status: DISCONTINUED | OUTPATIENT
Start: 2021-09-27 | End: 2021-09-29 | Stop reason: HOSPADM

## 2021-09-27 RX ORDER — ACETAMINOPHEN 325 MG/1
975 TABLET ORAL EVERY 6 HOURS PRN
Status: DISCONTINUED | OUTPATIENT
Start: 2021-09-27 | End: 2021-09-29 | Stop reason: HOSPADM

## 2021-09-27 RX ORDER — BENZTROPINE MESYLATE 1 MG/ML
1 INJECTION INTRAMUSCULAR; INTRAVENOUS
Status: DISCONTINUED | OUTPATIENT
Start: 2021-09-27 | End: 2021-09-29 | Stop reason: HOSPADM

## 2021-09-27 RX ORDER — IBUPROFEN 600 MG/1
600 TABLET ORAL EVERY 8 HOURS PRN
Status: DISCONTINUED | OUTPATIENT
Start: 2021-09-27 | End: 2021-09-29 | Stop reason: HOSPADM

## 2021-09-27 RX ORDER — NICOTINE 21 MG/24HR
1 PATCH, TRANSDERMAL 24 HOURS TRANSDERMAL DAILY
Status: DISCONTINUED | OUTPATIENT
Start: 2021-09-27 | End: 2021-09-29 | Stop reason: HOSPADM

## 2021-09-27 RX ORDER — HYDROXYZINE HYDROCHLORIDE 25 MG/1
50 TABLET, FILM COATED ORAL
Status: DISCONTINUED | OUTPATIENT
Start: 2021-09-27 | End: 2021-09-29 | Stop reason: HOSPADM

## 2021-09-27 RX ORDER — DIPHENHYDRAMINE HYDROCHLORIDE 50 MG/ML
50 INJECTION INTRAMUSCULAR; INTRAVENOUS EVERY 6 HOURS PRN
Status: DISCONTINUED | OUTPATIENT
Start: 2021-09-27 | End: 2021-09-29 | Stop reason: HOSPADM

## 2021-09-27 RX ORDER — BENZTROPINE MESYLATE 0.5 MG/1
0.5 TABLET ORAL EVERY 6 HOURS PRN
Status: DISCONTINUED | OUTPATIENT
Start: 2021-09-27 | End: 2021-09-29 | Stop reason: HOSPADM

## 2021-09-27 RX ORDER — HALOPERIDOL 5 MG/ML
2.5 INJECTION INTRAMUSCULAR
Status: DISCONTINUED | OUTPATIENT
Start: 2021-09-27 | End: 2021-09-29 | Stop reason: HOSPADM

## 2021-09-27 RX ORDER — HALOPERIDOL 2 MG/1
2 TABLET ORAL
Status: DISCONTINUED | OUTPATIENT
Start: 2021-09-27 | End: 2021-09-29 | Stop reason: HOSPADM

## 2021-09-27 RX ORDER — LORAZEPAM 2 MG/ML
1 INJECTION INTRAMUSCULAR
Status: DISCONTINUED | OUTPATIENT
Start: 2021-09-27 | End: 2021-09-29 | Stop reason: HOSPADM

## 2021-09-27 RX ADMIN — VALPROIC ACID 500 MG: 250 CAPSULE ORAL at 21:19

## 2021-09-27 RX ADMIN — VALPROIC ACID 500 MG: 250 CAPSULE ORAL at 08:46

## 2021-09-27 RX ADMIN — QUETIAPINE FUMARATE 50 MG: 50 TABLET ORAL at 21:21

## 2021-09-27 RX ADMIN — MIRTAZAPINE 7.5 MG: 15 TABLET, FILM COATED ORAL at 21:20

## 2021-09-27 RX ADMIN — QUETIAPINE FUMARATE 50 MG: 50 TABLET ORAL at 08:45

## 2021-09-27 RX ADMIN — QUETIAPINE FUMARATE 200 MG: 200 TABLET ORAL at 21:20

## 2021-09-28 ENCOUNTER — TRANSITIONAL CARE MANAGEMENT (OUTPATIENT)
Dept: INTERNAL MEDICINE CLINIC | Facility: CLINIC | Age: 41
End: 2021-09-28

## 2021-09-28 PROBLEM — E43 SEVERE PROTEIN-CALORIE MALNUTRITION (HCC): Status: ACTIVE | Noted: 2021-09-28

## 2021-09-28 PROCEDURE — 99232 SBSQ HOSP IP/OBS MODERATE 35: CPT | Performed by: NURSE PRACTITIONER

## 2021-09-28 RX ORDER — QUETIAPINE FUMARATE 200 MG/1
200 TABLET, FILM COATED ORAL
Qty: 30 TABLET | Refills: 0 | Status: SHIPPED | OUTPATIENT
Start: 2021-09-28 | End: 2021-10-11

## 2021-09-28 RX ORDER — QUETIAPINE FUMARATE 50 MG/1
50 TABLET, FILM COATED ORAL
Qty: 30 TABLET | Refills: 0 | Status: SHIPPED | OUTPATIENT
Start: 2021-09-28 | End: 2021-10-11

## 2021-09-28 RX ORDER — MIRTAZAPINE 15 MG/1
7.5 TABLET, FILM COATED ORAL
Qty: 15 TABLET | Refills: 0 | Status: SHIPPED | OUTPATIENT
Start: 2021-09-28 | End: 2022-01-18

## 2021-09-28 RX ORDER — QUETIAPINE FUMARATE 50 MG/1
50 TABLET, FILM COATED ORAL
Qty: 30 TABLET | Refills: 0 | Status: SHIPPED | OUTPATIENT
Start: 2021-09-29 | End: 2021-10-11

## 2021-09-28 RX ORDER — VALPROIC ACID 250 MG/1
500 CAPSULE, LIQUID FILLED ORAL EVERY 12 HOURS SCHEDULED
Qty: 120 CAPSULE | Refills: 0 | Status: SHIPPED | OUTPATIENT
Start: 2021-09-28 | End: 2021-10-11

## 2021-09-28 RX ADMIN — QUETIAPINE FUMARATE 200 MG: 200 TABLET ORAL at 21:10

## 2021-09-28 RX ADMIN — VALPROIC ACID 500 MG: 250 CAPSULE ORAL at 20:37

## 2021-09-28 RX ADMIN — ZOLPIDEM TARTRATE 10 MG: 5 TABLET, FILM COATED ORAL at 21:11

## 2021-09-28 RX ADMIN — QUETIAPINE FUMARATE 50 MG: 50 TABLET ORAL at 21:11

## 2021-09-28 RX ADMIN — VALPROIC ACID 500 MG: 250 CAPSULE ORAL at 08:36

## 2021-09-28 RX ADMIN — QUETIAPINE FUMARATE 50 MG: 50 TABLET ORAL at 08:35

## 2021-09-28 RX ADMIN — MIRTAZAPINE 7.5 MG: 15 TABLET, FILM COATED ORAL at 21:11

## 2021-09-29 VITALS
WEIGHT: 117 LBS | BODY MASS INDEX: 16.75 KG/M2 | HEART RATE: 70 BPM | OXYGEN SATURATION: 99 % | TEMPERATURE: 97.5 F | DIASTOLIC BLOOD PRESSURE: 67 MMHG | HEIGHT: 70 IN | RESPIRATION RATE: 16 BRPM | SYSTOLIC BLOOD PRESSURE: 120 MMHG

## 2021-09-29 PROBLEM — Z00.8 MEDICAL CLEARANCE FOR PSYCHIATRIC ADMISSION: Status: RESOLVED | Noted: 2021-09-27 | Resolved: 2021-09-29

## 2021-09-29 PROBLEM — E43 SEVERE PROTEIN-CALORIE MALNUTRITION (HCC): Status: RESOLVED | Noted: 2021-09-28 | Resolved: 2021-09-29

## 2021-09-29 PROCEDURE — 99238 HOSP IP/OBS DSCHRG MGMT 30/<: CPT | Performed by: NURSE PRACTITIONER

## 2021-09-29 RX ADMIN — VALPROIC ACID 500 MG: 250 CAPSULE ORAL at 08:54

## 2021-09-29 RX ADMIN — QUETIAPINE FUMARATE 50 MG: 50 TABLET ORAL at 08:54

## 2021-10-04 ENCOUNTER — OFFICE VISIT (OUTPATIENT)
Dept: URGENT CARE | Facility: CLINIC | Age: 41
End: 2021-10-04
Payer: COMMERCIAL

## 2021-10-04 ENCOUNTER — APPOINTMENT (OUTPATIENT)
Dept: RADIOLOGY | Facility: CLINIC | Age: 41
End: 2021-10-04
Payer: COMMERCIAL

## 2021-10-04 VITALS
HEIGHT: 70 IN | WEIGHT: 125 LBS | HEART RATE: 96 BPM | DIASTOLIC BLOOD PRESSURE: 82 MMHG | TEMPERATURE: 98.6 F | OXYGEN SATURATION: 98 % | SYSTOLIC BLOOD PRESSURE: 132 MMHG | BODY MASS INDEX: 17.9 KG/M2 | RESPIRATION RATE: 18 BRPM

## 2021-10-04 DIAGNOSIS — M25.571 ACUTE RIGHT ANKLE PAIN: ICD-10-CM

## 2021-10-04 DIAGNOSIS — M25.471 EDEMA OF RIGHT ANKLE: Primary | ICD-10-CM

## 2021-10-04 PROCEDURE — 99213 OFFICE O/P EST LOW 20 MIN: CPT | Performed by: PHYSICIAN ASSISTANT

## 2021-10-04 PROCEDURE — 73610 X-RAY EXAM OF ANKLE: CPT

## 2021-10-04 PROCEDURE — 73630 X-RAY EXAM OF FOOT: CPT

## 2021-10-08 ENCOUNTER — OFFICE VISIT (OUTPATIENT)
Dept: UROLOGY | Facility: CLINIC | Age: 41
End: 2021-10-08
Payer: COMMERCIAL

## 2021-10-08 VITALS
WEIGHT: 131 LBS | HEART RATE: 88 BPM | BODY MASS INDEX: 18.8 KG/M2 | SYSTOLIC BLOOD PRESSURE: 132 MMHG | DIASTOLIC BLOOD PRESSURE: 78 MMHG

## 2021-10-08 DIAGNOSIS — A63.0 CONDYLOMA: Primary | ICD-10-CM

## 2021-10-08 PROCEDURE — 99213 OFFICE O/P EST LOW 20 MIN: CPT | Performed by: UROLOGY

## 2021-10-11 ENCOUNTER — OFFICE VISIT (OUTPATIENT)
Dept: INTERNAL MEDICINE CLINIC | Facility: CLINIC | Age: 41
End: 2021-10-11
Payer: COMMERCIAL

## 2021-10-11 VITALS
RESPIRATION RATE: 14 BRPM | BODY MASS INDEX: 19.34 KG/M2 | HEIGHT: 70 IN | OXYGEN SATURATION: 98 % | WEIGHT: 135.13 LBS | HEART RATE: 82 BPM | TEMPERATURE: 98.6 F

## 2021-10-11 DIAGNOSIS — Z13.1 SCREENING FOR DIABETES MELLITUS: ICD-10-CM

## 2021-10-11 DIAGNOSIS — Z13.0 SCREENING FOR DEFICIENCY ANEMIA: ICD-10-CM

## 2021-10-11 DIAGNOSIS — F31.9 BIPOLAR I DISORDER (HCC): Primary | ICD-10-CM

## 2021-10-11 DIAGNOSIS — Z13.29 SCREENING FOR THYROID DISORDER: ICD-10-CM

## 2021-10-11 DIAGNOSIS — E55.9 VITAMIN D DEFICIENCY: ICD-10-CM

## 2021-10-11 DIAGNOSIS — F41.9 ANXIETY: ICD-10-CM

## 2021-10-11 DIAGNOSIS — Z13.220 SCREENING, LIPID: ICD-10-CM

## 2021-10-11 PROCEDURE — 99495 TRANSJ CARE MGMT MOD F2F 14D: CPT | Performed by: PHYSICIAN ASSISTANT

## 2021-10-11 RX ORDER — QUETIAPINE 150 MG/1
150 TABLET, FILM COATED, EXTENDED RELEASE ORAL
Qty: 30 TABLET | Refills: 2 | Status: SHIPPED | OUTPATIENT
Start: 2021-10-11 | End: 2022-07-28

## 2022-01-17 RX ORDER — AMOXICILLIN 500 MG/1
CAPSULE ORAL
COMMUNITY
End: 2022-01-18

## 2022-01-17 RX ORDER — VALPROIC ACID 250 MG/1
CAPSULE, LIQUID FILLED ORAL
COMMUNITY
End: 2022-01-18

## 2022-01-18 ENCOUNTER — OFFICE VISIT (OUTPATIENT)
Dept: INTERNAL MEDICINE CLINIC | Facility: CLINIC | Age: 42
End: 2022-01-18
Payer: COMMERCIAL

## 2022-01-18 ENCOUNTER — APPOINTMENT (OUTPATIENT)
Dept: RADIOLOGY | Facility: CLINIC | Age: 42
End: 2022-01-18
Payer: COMMERCIAL

## 2022-01-18 VITALS
HEART RATE: 85 BPM | OXYGEN SATURATION: 97 % | DIASTOLIC BLOOD PRESSURE: 64 MMHG | SYSTOLIC BLOOD PRESSURE: 122 MMHG | WEIGHT: 131 LBS | BODY MASS INDEX: 18.75 KG/M2 | TEMPERATURE: 98.1 F | HEIGHT: 70 IN

## 2022-01-18 DIAGNOSIS — M79.645 PAIN OF LEFT MIDDLE FINGER: Primary | ICD-10-CM

## 2022-01-18 DIAGNOSIS — M79.645 PAIN OF LEFT MIDDLE FINGER: ICD-10-CM

## 2022-01-18 PROCEDURE — 73130 X-RAY EXAM OF HAND: CPT

## 2022-01-18 PROCEDURE — 99214 OFFICE O/P EST MOD 30 MIN: CPT | Performed by: PHYSICIAN ASSISTANT

## 2022-01-18 NOTE — ASSESSMENT & PLAN NOTE
Wart vs forein body  Xray ordered  Pt may try salacyclic acid paste or compound w products   Update labs

## 2022-01-18 NOTE — PROGRESS NOTES
Assessment/Plan:  Problem List Items Addressed This Visit        Other    Pain of left middle finger - Primary     Wart vs forein body  Xray ordered  Pt may try salacyclic acid paste or compound w products  Update labs         Relevant Orders    XR hand 3+ vw left           Diagnoses and all orders for this visit:    Pain of left middle finger  -     XR hand 3+ vw left; Future    Other orders  -     Discontinue: amoxicillin (AMOXIL) 500 mg capsule; amoxicillin 500 mg capsule   take 1 capsule by mouth every 8 hours for 7 days (Patient not taking: Reported on 1/18/2022)  -     Discontinue: valproic acid (DEPAKENE) 250 mg capsule; valproic acid 250 mg capsule   take 2 capsules by mouth every 12 hours (Patient not taking: Reported on 1/18/2022)        Pain of left middle finger  Wart vs forein body  Xray ordered  Pt may try salacyclic acid paste or compound w products  Update labs      Subjective:      Patient ID: Payton Morgan is a 42 y o  male  Pt presents for routine visit  He is due for labs  Covid vaccine education provided  He notes he remains compliant with his medications and they remain beneficial for his mood  He notes a painful lump on the palmar surface of his left middle finger  Present x months  The following portions of the patient's history were reviewed and updated as appropriate:   He has a past medical history of Anxiety, Bipolar disorder ([address] ), Depression, and Wears glasses  ,  does not have any pertinent problems on file  ,   has a past surgical history that includes MOLE EXCISION (Left); Myringotomy w/ tubes (1980); pr lap,cholecystectomy (N/A, 5/24/2021); Cholecystectomy; and Laser ablation of condylomas (N/A, 8/4/2021)  ,  family history includes Aneurysm in his mother; Arthritis in his father  ,   reports that he has been smoking cigarettes  He has been smoking about 1 00 pack per day  He has never used smokeless tobacco  He reports current drug use  Drugs: Marijuana and Methamphetamines  He reports that he does not drink alcohol ,  is allergic to haloperidol and valproic acid     Current Outpatient Medications   Medication Sig Dispense Refill    patient supplied medication medical marijuana       QUEtiapine (SEROquel XR) 150 mg 24 hr tablet Take 1 tablet (150 mg total) by mouth daily at bedtime 30 tablet 2     No current facility-administered medications for this visit  Review of Systems   Constitutional: Negative for chills and fever  HENT: Negative for congestion, ear pain, hearing loss, postnasal drip, rhinorrhea, sinus pressure, sinus pain, sore throat and trouble swallowing  Eyes: Negative for pain and visual disturbance  Respiratory: Negative for cough, chest tightness, shortness of breath and wheezing  Cardiovascular: Negative  Negative for chest pain, palpitations and leg swelling  Gastrointestinal: Negative for abdominal pain, blood in stool, constipation, diarrhea, nausea and vomiting  Endocrine: Negative for cold intolerance, heat intolerance, polydipsia, polyphagia and polyuria  Genitourinary: Negative for difficulty urinating, dysuria, flank pain and urgency  Musculoskeletal: Negative for arthralgias, back pain, gait problem and myalgias  Skin: Positive for wound  Negative for rash  Allergic/Immunologic: Negative  Neurological: Negative for dizziness, weakness, light-headedness and headaches  Hematological: Negative  Psychiatric/Behavioral: Negative for behavioral problems, dysphoric mood and sleep disturbance  The patient is not nervous/anxious  PHQ-2/9 Depression Screening            Objective:  Vitals:    01/18/22 1019   BP: 122/64   BP Location: Left arm   Patient Position: Sitting   Pulse: 85   Temp: 98 1 °F (36 7 °C)   SpO2: 97%   Weight: 59 4 kg (131 lb)   Height: 5' 10" (1 778 m)     Body mass index is 18 8 kg/m²  Physical Exam  Constitutional:       General: He is not in acute distress  Appearance: He is well-developed   He is not diaphoretic  HENT:      Head: Normocephalic and atraumatic  Right Ear: External ear normal       Left Ear: External ear normal       Nose: Nose normal       Mouth/Throat:      Pharynx: No oropharyngeal exudate  Eyes:      General: No scleral icterus  Right eye: No discharge  Left eye: No discharge  Conjunctiva/sclera: Conjunctivae normal       Pupils: Pupils are equal, round, and reactive to light  Neck:      Thyroid: No thyromegaly  Cardiovascular:      Rate and Rhythm: Normal rate and regular rhythm  Heart sounds: Normal heart sounds  No murmur heard  No friction rub  No gallop  Pulmonary:      Effort: Pulmonary effort is normal  No respiratory distress  Breath sounds: Normal breath sounds  No wheezing or rales  Abdominal:      General: Bowel sounds are normal  There is no distension  Palpations: Abdomen is soft  Tenderness: There is no abdominal tenderness  Musculoskeletal:         General: No tenderness or deformity  Normal range of motion  Cervical back: Normal range of motion and neck supple  Skin:     General: Skin is warm and dry  Neurological:      Mental Status: He is alert and oriented to person, place, and time  Cranial Nerves: No cranial nerve deficit  Psychiatric:         Behavior: Behavior normal          Thought Content: Thought content normal          Judgment: Judgment normal          Tobacco Cessation Counseling: Tobacco cessation counseling was provided   The patient is sincerely urged to quit consumption of tobacco  He is not ready to quit tobacco

## 2022-03-16 PROBLEM — A63.0 CONDYLOMA OF MALE GENITALIA: Status: ACTIVE | Noted: 2022-03-16

## 2022-03-16 NOTE — ASSESSMENT & PLAN NOTE
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, base of penis and scrotum  8/4/2021- at least 15 lesions ranging from 2 mm to 8 mm of the dorsal ventral and side of the penile shaft, 2 lesions the left hemiscrotum, 3-4 lesions in the infrapubic region were burned and desiccated  Lesions started when he was in [facility], and was not having any sexual contact, so they may have been lying dormant from previous sexual encounters a long time ago  No problems after the surgery, no voiding problems  ·   He does not have a renal mass- he has simple 1 6 cm cyst of his left kidney  CT scan 6/11/2021 showed this, otherwise normal urinary tract  · Circumcised phallus, and there are some lesions which I believe were desiccated with the laser that are showing scar tissue versus possible papillary recurrence  Overall looks much better than preoperatively  The areas of concern are on the left shaft in the mid region primarily    · Referral to urology for possible repeat CO2 laser treatment  · Will order Imiquimod

## 2022-03-16 NOTE — PROGRESS NOTES
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, base of penis and scrotum  8/4/2021- at least 15 lesions ranging from 2 mm to 8 mm of the dorsal ventral and side of the penile shaft, 2 lesions the left hemiscrotum, 3-4 lesions in the infrapubic region were burned and desiccated  Lesions started when he was in retirement, and was not having any sexual contact, so they may have been lying dormant from previous sexual encounters a long time ago  No problems after the surgery, no voiding problems  ·   He does not have a renal mass- he has simple 1 6 cm cyst of his left kidney  CT scan 6/11/2021 showed this, otherwise normal urinary tract  · Circumcised phallus, and there are some lesions which I believe were desiccated with the laser that are showing scar tissue versus possible papillary recurrence  Overall looks much better than preoperatively  The areas of concern are on the left shaft in the mid region primarily  · Referral to urology for possible repeat CO2 laser treatment  · Will order Imiquimod           Relevant Medications    imiquimod (ALDARA) 5 % cream (Start on 3/18/2022)    Other Relevant Orders    Ambulatory Referral to Urology          Subjective:      Patient ID: Renaldo Forde is a 42 y o  male  The patient is here with complaints of recurrent condyloma  The following portions of the patient's history were reviewed and updated as appropriate:     Past Medical History:  He has a past medical history of Anxiety, Depression, and Wears glasses  ,  _______________________________________________________________________  Medical Problems:  does not have any pertinent problems on file ,  _______________________________________________________________________  Past Surgical History:   has a past surgical history that includes MOLE EXCISION (Left); Myringotomy w/ tubes (1980); pr lap,cholecystectomy (N/A, 5/24/2021); Cholecystectomy; and Laser ablation of condylomas (N/A, 8/4/2021)  ,  _______________________________________________________________________  Family History:  family history includes Aneurysm in his mother; Arthritis in his father ,  _______________________________________________________________________  Social History:   reports that he has been smoking cigarettes  He has been smoking about 1 00 pack per day  He has never used smokeless tobacco  He reports current drug use  Drugs: Marijuana and Methamphetamines  He reports that he does not drink alcohol ,  _______________________________________________________________________  Allergies:  is allergic to haloperidol, ketamine, and valproic acid     _______________________________________________________________________  Current Outpatient Medications   Medication Sig Dispense Refill    ibuprofen (MOTRIN) 800 mg tablet ibuprofen 800 mg tablet   take 1 tablet by mouth every 8 hours if needed for pain      patient supplied medication medical marijuana       QUEtiapine (SEROquel XR) 150 mg 24 hr tablet Take 1 tablet (150 mg total) by mouth daily at bedtime 30 tablet 2    [START ON 3/18/2022] imiquimod (ALDARA) 5 % cream Apply 1 packet topically 3 (three) times a week 24 each 0     No current facility-administered medications for this visit      _______________________________________________________________________      Review of Systems   Constitutional: Negative for activity change, chills, fatigue and fever  HENT: Negative for rhinorrhea and sore throat  Eyes: Negative for pain  Respiratory: Negative for cough and shortness of breath  Cardiovascular: Negative for chest pain, palpitations and leg swelling  Gastrointestinal: Negative for abdominal pain, constipation, diarrhea, nausea and vomiting     Genitourinary: Negative for difficulty urinating, flank pain, frequency and urgency  Musculoskeletal: Negative for gait problem, joint swelling and myalgias  Skin: Positive for color change  Positive genital warts   Neurological: Negative for dizziness, weakness, light-headedness and headaches  Psychiatric/Behavioral: Negative for sleep disturbance  The patient is not nervous/anxious  All other systems reviewed and are negative  Objective:  Vitals:    03/17/22 0800   BP: 115/80   BP Location: Left arm   Patient Position: Sitting   Cuff Size: Standard   Pulse: 78   Temp: (!) 96 7 °F (35 9 °C)   SpO2: 96%   Weight: 60 1 kg (132 lb 6 4 oz)   Height: 5' 10" (1 778 m)     Body mass index is 19 kg/m²  Physical Exam  Vitals reviewed  Constitutional:       General: He is awake  Appearance: Normal appearance  He is well-developed and normal weight  HENT:      Head: Normocephalic and atraumatic  Nose: Nose normal       Mouth/Throat:      Mouth: Mucous membranes are moist    Eyes:      Extraocular Movements: Extraocular movements intact  Cardiovascular:      Rate and Rhythm: Normal rate  Pulses: Normal pulses  Pulmonary:      Effort: Pulmonary effort is normal    Abdominal:      General: Bowel sounds are normal       Palpations: Abdomen is soft  Genitourinary:     Comments: C/o penile warts with scrotal involvement   Musculoskeletal:         General: Normal range of motion  Cervical back: Normal range of motion  Right lower leg: No edema  Left lower leg: No edema  Skin:     General: Skin is warm and dry  Comments: Penile and scrotal warts   Neurological:      Mental Status: He is alert and oriented to person, place, and time  Psychiatric:         Attention and Perception: Attention normal          Mood and Affect: Mood normal          Speech: Speech normal          Behavior: Behavior normal  Behavior is cooperative

## 2022-03-17 ENCOUNTER — OFFICE VISIT (OUTPATIENT)
Dept: INTERNAL MEDICINE CLINIC | Facility: CLINIC | Age: 42
End: 2022-03-17
Payer: COMMERCIAL

## 2022-03-17 VITALS
SYSTOLIC BLOOD PRESSURE: 115 MMHG | DIASTOLIC BLOOD PRESSURE: 80 MMHG | TEMPERATURE: 96.7 F | HEART RATE: 78 BPM | BODY MASS INDEX: 18.96 KG/M2 | HEIGHT: 70 IN | WEIGHT: 132.4 LBS | OXYGEN SATURATION: 96 %

## 2022-03-17 DIAGNOSIS — A63.0 CONDYLOMA OF MALE GENITALIA: Primary | ICD-10-CM

## 2022-03-17 PROCEDURE — 99214 OFFICE O/P EST MOD 30 MIN: CPT | Performed by: NURSE PRACTITIONER

## 2022-03-17 RX ORDER — IBUPROFEN 800 MG/1
TABLET ORAL
COMMUNITY
End: 2022-07-28

## 2022-03-17 RX ORDER — IMIQUIMOD 12.5 MG/.25G
1 CREAM TOPICAL 3 TIMES WEEKLY
Qty: 24 EACH | Refills: 0 | Status: SHIPPED | OUTPATIENT
Start: 2022-03-18 | End: 2022-04-22

## 2022-03-17 NOTE — PATIENT INSTRUCTIONS
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, base of penis and scrotum  8/4/2021- at least 15 lesions ranging from 2 mm to 8 mm of the dorsal ventral and side of the penile shaft, 2 lesions the left hemiscrotum, 3-4 lesions in the infrapubic region were burned and desiccated  Lesions started when he was in [employer], and was not having any sexual contact, so they may have been lying dormant from previous sexual encounters a long time ago  No problems after the surgery, no voiding problems  ·   He does not have a renal mass- he has simple 1 6 cm cyst of his left kidney  CT scan 6/11/2021 showed this, otherwise normal urinary tract  · Circumcised phallus, and there are some lesions which I believe were desiccated with the laser that are showing scar tissue versus possible papillary recurrence  Overall looks much better than preoperatively  The areas of concern are on the left shaft in the mid region primarily    · Referral to urology for possible repeat CO2 laser treatment  · Will order Imiquimod           Relevant Medications    imiquimod (ALDARA) 5 % cream (Start on 3/18/2022)    Other Relevant Orders    Ambulatory Referral to Urology

## 2022-04-22 ENCOUNTER — OFFICE VISIT (OUTPATIENT)
Dept: INTERNAL MEDICINE CLINIC | Facility: CLINIC | Age: 42
End: 2022-04-22
Payer: COMMERCIAL

## 2022-04-22 VITALS
DIASTOLIC BLOOD PRESSURE: 82 MMHG | SYSTOLIC BLOOD PRESSURE: 118 MMHG | WEIGHT: 131.6 LBS | HEART RATE: 76 BPM | BODY MASS INDEX: 18.84 KG/M2 | HEIGHT: 70 IN | TEMPERATURE: 97.5 F | OXYGEN SATURATION: 98 %

## 2022-04-22 DIAGNOSIS — A63.0 CONDYLOMA OF MALE GENITALIA: ICD-10-CM

## 2022-04-22 DIAGNOSIS — I73.00 RAYNAUD'S DISEASE WITHOUT GANGRENE: Primary | ICD-10-CM

## 2022-04-22 PROCEDURE — 99213 OFFICE O/P EST LOW 20 MIN: CPT | Performed by: PHYSICIAN ASSISTANT

## 2022-04-22 NOTE — ASSESSMENT & PLAN NOTE
Pts symptoms most consistent with raynauds  Recommend keeping as warm as possible  Discussed CCB but pt defers as side effects may make him feel worse  Will call if symptoms worsen or change

## 2022-04-22 NOTE — PROGRESS NOTES
Assessment/Plan:  Problem List Items Addressed This Visit        Cardiovascular and Mediastinum    Raynaud's disease without gangrene - Primary     Pts symptoms most consistent with raynauds  Recommend keeping as warm as possible  Discussed CCB but pt defers as side effects may make him feel worse  Will call if symptoms worsen or change  Musculoskeletal and Integument    Condyloma of male genitalia    Relevant Medications    podofilox (CONDYLOX) 0 5 % gel           Diagnoses and all orders for this visit:    Raynaud's disease without gangrene    Condyloma of male genitalia  -     podofilox (CONDYLOX) 0 5 % gel; Apply topically 2 (two) times a day        Raynaud's disease without gangrene  Pts symptoms most consistent with raynauds  Recommend keeping as warm as possible  Discussed CCB but pt defers as side effects may make him feel worse  Will call if symptoms worsen or change  Subjective:      Patient ID: Georgia Del Valle is a 42 y o  male  Pt presents for evaluation of intermittent discoloration of his right great toe  He notes it often turns a bluish purple along one portion of his great toe  At times it gets numb  There is no pain  No other toes affected  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  Not present at the moment  The following portions of the patient's history were reviewed and updated as appropriate:   He has a past medical history of Anxiety, Depression, and Wears glasses  ,  does not have any pertinent problems on file  ,   has a past surgical history that includes MOLE EXCISION (Left); Myringotomy w/ tubes (1980); pr lap,cholecystectomy (N/A, 5/24/2021); Cholecystectomy; and Laser ablation of condylomas (N/A, 8/4/2021)  ,  family history includes Aneurysm in his mother; Arthritis in his father  ,   reports that he has been smoking cigarettes  He has been smoking about 1 00 pack per day  He has never used smokeless tobacco  He reports current drug use   Drugs: Marijuana and Methamphetamines  He reports that he does not drink alcohol ,  is allergic to haloperidol, ketamine, and valproic acid     Current Outpatient Medications   Medication Sig Dispense Refill    ibuprofen (MOTRIN) 800 mg tablet ibuprofen 800 mg tablet   take 1 tablet by mouth every 8 hours if needed for pain      patient supplied medication medical marijuana       QUEtiapine (SEROquel XR) 150 mg 24 hr tablet Take 1 tablet (150 mg total) by mouth daily at bedtime 30 tablet 2    podofilox (CONDYLOX) 0 5 % gel Apply topically 2 (two) times a day 3 5 g 2     No current facility-administered medications for this visit  Review of Systems   Constitutional: Negative for chills and fever  HENT: Negative for congestion, ear pain, hearing loss, postnasal drip, rhinorrhea, sinus pressure, sinus pain, sore throat and trouble swallowing  Eyes: Negative for pain and visual disturbance  Respiratory: Negative for cough, chest tightness, shortness of breath and wheezing  Cardiovascular: Negative  Negative for chest pain, palpitations and leg swelling  Gastrointestinal: Negative for abdominal pain, blood in stool, constipation, diarrhea, nausea and vomiting  Endocrine: Negative for cold intolerance, heat intolerance, polydipsia, polyphagia and polyuria  Genitourinary: Negative for difficulty urinating, dysuria, flank pain and urgency  Musculoskeletal: Negative for arthralgias, back pain, gait problem and myalgias  Skin: Positive for color change  Negative for rash  Allergic/Immunologic: Negative  Neurological: Negative for dizziness, weakness, light-headedness and headaches  Hematological: Negative  Psychiatric/Behavioral: Negative for behavioral problems, dysphoric mood and sleep disturbance  The patient is not nervous/anxious            PHQ-2/9 Depression Screening            Objective:  Vitals:    04/22/22 1123   BP: 118/82   Pulse: 76   Temp: 97 5 °F (36 4 °C)   TempSrc: Tympanic   SpO2: 98% Weight: 59 7 kg (131 lb 9 6 oz)   Height: 5' 10" (1 778 m)     Body mass index is 18 88 kg/m²  Physical Exam  Constitutional:       General: He is not in acute distress  Appearance: He is well-developed  He is not diaphoretic  HENT:      Head: Normocephalic and atraumatic  Right Ear: External ear normal       Left Ear: External ear normal       Nose: Nose normal       Mouth/Throat:      Pharynx: No oropharyngeal exudate  Eyes:      General: No scleral icterus  Right eye: No discharge  Left eye: No discharge  Conjunctiva/sclera: Conjunctivae normal       Pupils: Pupils are equal, round, and reactive to light  Neck:      Thyroid: No thyromegaly  Cardiovascular:      Rate and Rhythm: Normal rate and regular rhythm  Heart sounds: Normal heart sounds  No murmur heard  No friction rub  No gallop  Pulmonary:      Effort: Pulmonary effort is normal  No respiratory distress  Breath sounds: Normal breath sounds  No wheezing or rales  Abdominal:      General: Bowel sounds are normal  There is no distension  Palpations: Abdomen is soft  Tenderness: There is no abdominal tenderness  Musculoskeletal:         General: No tenderness or deformity  Normal range of motion  Cervical back: Normal range of motion and neck supple  Skin:     General: Skin is warm and dry  Neurological:      Mental Status: He is alert and oriented to person, place, and time  Cranial Nerves: No cranial nerve deficit  Psychiatric:         Behavior: Behavior normal          Thought Content:  Thought content normal          Judgment: Judgment normal

## 2022-04-26 DIAGNOSIS — A63.0 CONDYLOMA OF MALE GENITALIA: Primary | ICD-10-CM

## 2022-04-26 RX ORDER — IMIQUIMOD 12.5 MG/.25G
1 CREAM TOPICAL 3 TIMES WEEKLY
COMMUNITY
End: 2022-04-26 | Stop reason: SDUPTHER

## 2022-04-26 RX ORDER — IMIQUIMOD 12.5 MG/.25G
1 CREAM TOPICAL 3 TIMES WEEKLY
Qty: 24 EACH | Refills: 1 | Status: SHIPPED | OUTPATIENT
Start: 2022-04-27 | End: 2022-07-28

## 2022-05-25 ENCOUNTER — OFFICE VISIT (OUTPATIENT)
Dept: UROLOGY | Facility: CLINIC | Age: 42
End: 2022-05-25
Payer: COMMERCIAL

## 2022-05-25 ENCOUNTER — TELEPHONE (OUTPATIENT)
Dept: UROLOGY | Facility: CLINIC | Age: 42
End: 2022-05-25

## 2022-05-25 VITALS
DIASTOLIC BLOOD PRESSURE: 72 MMHG | HEIGHT: 70 IN | BODY MASS INDEX: 18.32 KG/M2 | WEIGHT: 128 LBS | SYSTOLIC BLOOD PRESSURE: 122 MMHG

## 2022-05-25 DIAGNOSIS — A63.0 CONDYLOMA: Primary | ICD-10-CM

## 2022-05-25 PROCEDURE — 99214 OFFICE O/P EST MOD 30 MIN: CPT | Performed by: UROLOGY

## 2022-05-25 NOTE — TELEPHONE ENCOUNTER
Teddy Stewart (Self) [phone] (M)     Is calling back    The below information was verbally given to him:    Patient scheduled for 6/30/22 @ 1:30 with Dr Marika Rodriguez in [place] for 60 minute procedure  Voicemail message left for patient to return call to confirm  Will need to have him arrive 15 minutes early and also let him know that appointment on 6/6/22 with Yeimy Pritchett was cancelled

## 2022-05-25 NOTE — TELEPHONE ENCOUNTER
Patient scheduled for 6/30/22 @ 1:30 with Dr Tracey Szymanski in [place] for 60 minute procedure  Voicemail message left for patient to return call to confirm  Will need to have him arrive 15 minutes early and also let him know that appointment on 6/6/22 with Tari Motley was cancelled

## 2022-05-25 NOTE — PROGRESS NOTES
[address] [facility]  [facility]  [address] [place]  [place], [address] [facility]  [phone]  www  [facility]  org      NAME: Siomara Esquivel  AGE: 42 y o  SEX: male  : 1980   MRN: [id number]    DATE: 2022  TIME: 10:02 AM    Assessment and Plan:    Recurrence of multiple condyloma as described below  We discussed laser fulguration versus removal and I think it would be best to simply remove these  I think it could do these in 1-2 treatments in the office with local anesthesia  He is agreeable to this and we will set this up  Chief Complaint     Chief Complaint   Patient presents with    Follow-up       History of Present Illness   Follow-up history of condyloma:  Status post CO2 laser fulguration of condyloma the penile shaft, base of penis and scrotum 2021  He had least 15 lesions ranging from 2 mm to 8 mm of the dorsal, ventral and side of the penile shaft, 2 lesions of the left hemiscrotum, 3-4 lesion in the infrapubic region and these were burned desiccated  Here for re-evaluation  He has had recurrence unfortunately over the past couple months and has used several courses of imiquimod  This is been unsuccessful  No new sexual contacts        The following portions of the patient's history were reviewed and updated as appropriate: allergies, current medications, past family history, past medical history, past social history, past surgical history and problem list   Past Medical History:   Diagnosis Date    Anxiety     Depression     Wears glasses      Past Surgical History:   Procedure Laterality Date    CHOLECYSTECTOMY      LASER ABLATION OF CONDYLOMAS N/A 2021    Procedure: EXCISION CONDYLOMA PERINEAL (PENILE) WITH LASER CO2;  Surgeon: Jose Muhammad MD;  Location: [facility];  Service: Urology    MOLE EXCISION Left     leg    MYRINGOTOMY W/ TUBES      VT LAP,CHOLECYSTECTOMY N/A 2021    Procedure: CHOLECYSTECTOMY LAPAROSCOPIC;  Surgeon: Lavon Lazo MD;  Location: [facility] MAIN OR;  Service: General     shoulder  Review of Systems   Review of Systems   Constitutional: Negative for fever  Respiratory: Negative for shortness of breath  Cardiovascular: Negative for chest pain  Genitourinary: Negative for difficulty urinating, genital sores, hematuria and penile discharge  Active Problem List     Patient Active Problem List   Diagnosis    Posttraumatic stress disorder    Bipolar I disorder ([address] )    Anxiety    Adult antisocial behavior    Adjustment disorder    Medical marijuana use    Pain of left middle finger    Condyloma of male genitalia    Raynaud's disease without gangrene       Objective   /72   Ht 5' 10" (1 778 m)   Wt 58 1 kg (128 lb)   BMI 18 37 kg/m²     Physical Exam  Vitals reviewed  Constitutional:       Appearance: Normal appearance  HENT:      Head: Normocephalic and atraumatic  Eyes:      Extraocular Movements: Extraocular movements intact  Pulmonary:      Effort: Pulmonary effort is normal    Abdominal:      General: Abdomen is flat  There is no distension  Palpations: Abdomen is soft  There is no mass  Hernia: No hernia is present  Genitourinary:     Testes: Normal       Comments: Circumcised phallus, with recurrence of lesions primarily on the dorsal penis both distally and primarily near the penile mons junction  No scrotal lesions  Some the lesions are 1 cm  Consistent with condyloma  Musculoskeletal:         General: Normal range of motion  Cervical back: Normal range of motion  Skin:     Coloration: Skin is not jaundiced or pale  Neurological:      General: No focal deficit present  Mental Status: He is alert and oriented to person, place, and time  Psychiatric:         Mood and Affect: Mood normal          Behavior: Behavior normal          Thought Content:  Thought content normal          Judgment: Judgment normal  Current Medications     Current Outpatient Medications:     ibuprofen (MOTRIN) 800 mg tablet, ibuprofen 800 mg tablet  take 1 tablet by mouth every 8 hours if needed for pain, Disp: , Rfl:     imiquimod (ALDARA) 5 % cream, Apply 1 packet topically 3 (three) times a week, Disp: 24 each, Rfl: 1    patient supplied medication, medical marijuana , Disp: , Rfl:     podofilox (CONDYLOX) 0 5 % gel, Apply topically 2 (two) times a day, Disp: 3 5 g, Rfl: 2    QUEtiapine (SEROquel XR) 150 mg 24 hr tablet, Take 1 tablet (150 mg total) by mouth daily at bedtime, Disp: 30 tablet, Rfl: 2        Nazario Capone MD Never smoker

## 2022-05-25 NOTE — TELEPHONE ENCOUNTER
----- Message from Shadi Kirk MD sent at 5/25/2022 10:05 AM EDT -----  The patient is currently checking out, but please set him up for a condyloma excision, 1 hour visit procedure in the office  We will need a cutdown tray or vas tray with 3-0 Monocryl sutures, needle , scalpel scissors, sponges and thermal cautery

## 2022-06-21 ENCOUNTER — APPOINTMENT (OUTPATIENT)
Dept: RADIOLOGY | Facility: CLINIC | Age: 42
End: 2022-06-21
Payer: COMMERCIAL

## 2022-06-21 ENCOUNTER — OFFICE VISIT (OUTPATIENT)
Dept: URGENT CARE | Facility: CLINIC | Age: 42
End: 2022-06-21
Payer: COMMERCIAL

## 2022-06-21 VITALS — OXYGEN SATURATION: 98 % | TEMPERATURE: 98.4 F | HEART RATE: 64 BPM | RESPIRATION RATE: 18 BRPM
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
## 2022-06-21 PROCEDURE — 71046 X-RAY EXAM CHEST 2 VIEWS: CPT

## 2022-06-21 PROCEDURE — 72100 X-RAY EXAM L-S SPINE 2/3 VWS: CPT

## 2022-06-21 PROCEDURE — 99213 OFFICE O/P EST LOW 20 MIN: CPT

## 2022-06-21 RX ORDER — QUETIAPINE FUMARATE 100 MG/1
100 TABLET, FILM COATED ORAL
COMMUNITY
Start: 2022-06-11
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
## 2022-06-21 NOTE — LETTER
June 21, 2022     Patient: Mica Krishnan   YOB: 1980   Date of Visit: 6/21/2022       To Whom it May Concern:    Tabitha Toro was seen in my clinic on 6/21/2022  He may return to work on 6/22/2022  If you have any questions or concerns, please don't hesitate to call           Sincerely,          MIGUELITO Brenner        CC: No Recipients

## 2022-06-21 NOTE — PATIENT INSTRUCTIONS
Take tylenol or ibuprofen as needed for pain  Apply a combination of heat and ice to the area for 20 minutes every 1-2 hours while awake  If you develop any worsening pain or bruising go to the ER for evaluation

## 2022-06-30 ENCOUNTER — PROCEDURE VISIT (OUTPATIENT)
Dept: UROLOGY | Facility: CLINIC | Age: 42
End: 2022-06-30
Payer: COMMERCIAL

## 2022-06-30 VITALS
HEART RATE: 88 BPM | DIASTOLIC BLOOD PRESSURE: 64 MMHG | SYSTOLIC BLOOD PRESSURE: 126 MMHG | BODY MASS INDEX: 17.94 KG/M2 | WEIGHT: 125 LBS

## 2022-06-30 DIAGNOSIS — A63.0 CONDYLOMA: Primary | ICD-10-CM

## 2022-06-30 PROCEDURE — 88344 IMHCHEM/IMCYTCHM EA MLT ANTB: CPT | Performed by: PATHOLOGY

## 2022-06-30 PROCEDURE — 54060 EXCISION OF PENIS LESION(S): CPT | Performed by: UROLOGY

## 2022-06-30 PROCEDURE — 88305 TISSUE EXAM BY PATHOLOGIST: CPT | Performed by: PATHOLOGY

## 2022-06-30 RX ORDER — CEPHALEXIN 500 MG/1
500 CAPSULE ORAL EVERY 6 HOURS SCHEDULED
Qty: 12 CAPSULE | Refills: 0 | Status: SHIPPED | OUTPATIENT
Start: 2022-06-30 | End: 2022-07-03

## 2022-06-30 NOTE — LETTER
June 30, 2022     Patient: Brandon Mukherjee  YOB: 1980  Date of Visit: 6/30/2022      To Whom it May Concern:    Eveline Burk is under my professional care  Bertha Champion was seen in my office on 6/30/2022  Bertha Champion may return to work on 07/01/22  If you have any questions or concerns, please don't hesitate to call           Sincerely,          Odilon Alves MD

## 2022-06-30 NOTE — PROGRESS NOTES
[address] [facility]  [facility]  [address] [facility] [place]  [place], [address] [facility]  [phone]  www  [facility]  org      NAME: Summer Nuñez  AGE: 42 y o  SEX: male  : 1980   MRN: [id number]    DATE: 2022  TIME: 1:14 PM    Assessment and Plan:    Excision of multiple condylomatous lesions, sent to pathology  Bacitracin or Neosporin to the wounds until they heal   Stitches will dissolve on their own  Follow-up 1 month for reassessment  The patient is been through this before  Chief Complaint   No chief complaint on file  History of Present Illness   Multiple condyloma lesions-status post CO2 laser fulguration of condyloma of the penile shaft, base penis and scrotum 2021  Has had recurrence over the past couple months, now here for excision in the office  Has lesions primarily the dorsal penis both distally and primarily near the penile mons junction  No scrotal lesions  Some lesions are 1 cm  Procedures :  Excision of multiple condyloma from the penis and scrotum and suprapubic region, fulguration of some as well  Approximately 12 lesions  Surgeon:  Kylie Baker MD    Anesthesia:  2% xylocaine plain given locally    Course procedure:  Patient was prepped and draped in supine positio with Betadine  Consent was obtained after explaining the risks of bleeding infection and poor cosmetic results  I used approximately 20 cc of 2% xylocaine to anesthetize the skin underneath each lesion  Using 15 blade I excised the larger lesions and the smaller lesions were fulgurated with thermal cautery  The cautery was also used for hemostasis  I closed the larger wounds with figure-of-eight 3-0 chromic gut sutures  The remainder was simply fulgurated  There proximally 12 lesions, and these encompassed the dorsal proximal shaft, side of the shaft, the left side of the scrotum and the suprapubic region    Hemostasis was excellent, all sponge needle counts were correct at the end the procedure  The patient tolerated the procedure well  Bacitracin was applied  The following portions of the patient's history were reviewed and updated as appropriate: allergies, current medications, past family history, past medical history, past social history, past surgical history and problem list   Past Medical History:   Diagnosis Date    Anxiety     Depression     Wears glasses      Past Surgical History:   Procedure Laterality Date    CHOLECYSTECTOMY      LASER ABLATION OF CONDYLOMAS N/A 8/4/2021    Procedure: EXCISION CONDYLOMA PERINEAL (PENILE) WITH LASER CO2;  Surgeon: Lamin Hernandez MD;  Location: [facility];  Service: Urology    MOLE EXCISION Left     leg    MYRINGOTOMY W/ TUBES  1980    IA LAP,CHOLECYSTECTOMY N/A 5/24/2021    Procedure: CHOLECYSTECTOMY LAPAROSCOPIC;  Surgeon: Minnie Gonzalez MD;  Location: [address] [place] [address] MAIN OR;  Service: General     shoulder  Review of Systems   Review of Systems    Active Problem List     Patient Active Problem List   Diagnosis    Posttraumatic stress disorder    Bipolar I disorder ([facility] [address] )    Anxiety    Adult antisocial behavior    Adjustment disorder    Medical marijuana use    Pain of left middle finger    Condyloma of male genitalia    Raynaud's disease without gangrene       Objective   There were no vitals taken for this visit      Physical Exam        Current Medications     Current Outpatient Medications:     ibuprofen (MOTRIN) 800 mg tablet, ibuprofen 800 mg tablet  take 1 tablet by mouth every 8 hours if needed for pain (Patient not taking: Reported on 6/21/2022), Disp: , Rfl:     imiquimod (ALDARA) 5 % cream, Apply 1 packet topically 3 (three) times a week (Patient not taking: Reported on 6/21/2022), Disp: 24 each, Rfl: 1    patient supplied medication, medical marijuana  (Patient not taking: Reported on 6/21/2022), Disp: , Rfl:     podofilox (CONDYLOX) 0 5 % gel, Apply topically 2 (two) times a day (Patient not taking: Reported on 6/21/2022), Disp: 3 5 g, Rfl: 2    QUEtiapine (SEROquel XR) 150 mg 24 hr tablet, Take 1 tablet (150 mg total) by mouth daily at bedtime (Patient not taking: Reported on 6/21/2022), Disp: 30 tablet, Rfl: 2    QUEtiapine (SEROquel) 100 mg tablet, Take 100 mg by mouth daily at bedtime, Disp: , Rfl:         Jesus Bustamante MD

## 2022-07-28 ENCOUNTER — OFFICE VISIT (OUTPATIENT)
Dept: INTERNAL MEDICINE CLINIC | Facility: CLINIC | Age: 42
End: 2022-07-28
Payer: COMMERCIAL

## 2022-07-28 VITALS
TEMPERATURE: 98.1 F | BODY MASS INDEX: 17.81 KG/M2 | SYSTOLIC BLOOD PRESSURE: 122 MMHG | HEIGHT: 70 IN | DIASTOLIC BLOOD PRESSURE: 62 MMHG | WEIGHT: 124.38 LBS | HEART RATE: 87 BPM | OXYGEN SATURATION: 96 %

## 2022-07-28 DIAGNOSIS — F31.9 BIPOLAR I DISORDER (HCC): ICD-10-CM

## 2022-07-28 DIAGNOSIS — M79.642 BILATERAL HAND PAIN: Primary | ICD-10-CM

## 2022-07-28 DIAGNOSIS — F15.20 OTHER STIMULANT DEPENDENCE, UNCOMPLICATED (HCC): ICD-10-CM

## 2022-07-28 DIAGNOSIS — M79.641 BILATERAL HAND PAIN: Primary | ICD-10-CM

## 2022-07-28 PROCEDURE — 99213 OFFICE O/P EST LOW 20 MIN: CPT | Performed by: PHYSICIAN ASSISTANT

## 2022-07-28 RX ORDER — MELOXICAM 15 MG/1
15 TABLET ORAL DAILY
Qty: 30 TABLET | Refills: 5 | Status: SHIPPED | OUTPATIENT
Start: 2022-07-28

## 2022-07-28 NOTE — PROGRESS NOTES
Assessment/Plan:  Problem List Items Addressed This Visit        Other    Bipolar I disorder ([facility] [address] )    Bilateral hand pain - Primary     Start mobic  Update labs  Adjust treatment according to results         Relevant Medications    meloxicam (Mobic) 15 mg tablet    Other Relevant Orders    RF Screen w/ Reflex to Titer    RESOLVED: Other stimulant dependence, uncomplicated (HCC)           Diagnoses and all orders for this visit:    Bilateral hand pain  -     RF Screen w/ Reflex to Titer; Future  -     meloxicam (Mobic) 15 mg tablet; Take 1 tablet (15 mg total) by mouth daily    Other stimulant dependence, uncomplicated (HCC)    Bipolar I disorder (HCC)      Bilateral hand pain  Start mobic  Update labs  Adjust treatment according to results      Subjective:      Patient ID: Dougie Abbott is a 42 y o  male  Pt presents for routine visit  He is doing fairly well overall  He is due for labs  He notes bilateral hand pain, loss of  strength and swollen knuckles  The following portions of the patient's history were reviewed and updated as appropriate:   He has a past medical history of Anxiety, Depression, and Wears glasses  ,  does not have any pertinent problems on file  ,   has a past surgical history that includes MOLE EXCISION (Left); Myringotomy w/ tubes (1980); pr lap,cholecystectomy (N/A, 5/24/2021); Cholecystectomy; and Laser ablation of condylomas (N/A, 8/4/2021)  ,  family history includes Aneurysm in his mother; Arthritis in his father  ,   reports that he has been smoking cigarettes  He has been smoking about 1 00 pack per day  He has never used smokeless tobacco  He reports current drug use  Drugs: Marijuana and Methamphetamines  He reports that he does not drink alcohol ,  is allergic to haloperidol, ketamine, and valproic acid     Current Outpatient Medications   Medication Sig Dispense Refill    meloxicam (Mobic) 15 mg tablet Take 1 tablet (15 mg total) by mouth daily 30 tablet 5    QUEtiapine (SEROquel) 100 mg tablet Take 100 mg by mouth daily at bedtime       No current facility-administered medications for this visit  Review of Systems   Constitutional: Negative for chills and fever  HENT: Negative for congestion, ear pain, hearing loss, postnasal drip, rhinorrhea, sinus pressure, sinus pain, sore throat and trouble swallowing  Eyes: Negative for pain and visual disturbance  Respiratory: Negative for cough, chest tightness, shortness of breath and wheezing  Cardiovascular: Negative  Negative for chest pain, palpitations and leg swelling  Gastrointestinal: Negative for abdominal pain, blood in stool, constipation, diarrhea, nausea and vomiting  Endocrine: Negative for cold intolerance, heat intolerance, polydipsia, polyphagia and polyuria  Genitourinary: Negative for difficulty urinating, dysuria, flank pain and urgency  Musculoskeletal: Positive for arthralgias  Negative for back pain, gait problem and myalgias  Skin: Negative for rash  Allergic/Immunologic: Negative  Neurological: Negative for dizziness, weakness, light-headedness and headaches  Hematological: Negative  Psychiatric/Behavioral: Negative for behavioral problems, dysphoric mood and sleep disturbance  The patient is not nervous/anxious  PHQ-2/9 Depression Screening            Objective:  Vitals:    07/28/22 0859   BP: 122/62   BP Location: Left arm   Patient Position: Sitting   Pulse: 87   Temp: 98 1 °F (36 7 °C)   SpO2: 96%   Weight: 56 4 kg (124 lb 6 oz)   Height: 5' 10" (1 778 m)     Body mass index is 17 85 kg/m²  Physical Exam  Constitutional:       General: He is not in acute distress  Appearance: He is well-developed  He is not diaphoretic  HENT:      Head: Normocephalic and atraumatic  Right Ear: External ear normal       Left Ear: External ear normal       Nose: Nose normal       Mouth/Throat:      Pharynx: No oropharyngeal exudate     Eyes:      General: No scleral icterus  Right eye: No discharge  Left eye: No discharge  Conjunctiva/sclera: Conjunctivae normal       Pupils: Pupils are equal, round, and reactive to light  Neck:      Thyroid: No thyromegaly  Cardiovascular:      Rate and Rhythm: Normal rate and regular rhythm  Heart sounds: Normal heart sounds  No murmur heard  No friction rub  No gallop  Pulmonary:      Effort: Pulmonary effort is normal  No respiratory distress  Breath sounds: Normal breath sounds  No wheezing or rales  Abdominal:      General: Bowel sounds are normal  There is no distension  Palpations: Abdomen is soft  Tenderness: There is no abdominal tenderness  Musculoskeletal:         General: No tenderness or deformity  Normal range of motion  Right hand: Swelling present  Decreased strength  Left hand: Swelling present  Decreased strength  Cervical back: Normal range of motion and neck supple  Skin:     General: Skin is warm and dry  Neurological:      Mental Status: He is alert and oriented to person, place, and time  Cranial Nerves: No cranial nerve deficit  Psychiatric:         Behavior: Behavior normal          Thought Content: Thought content normal          Judgment: Judgment normal        BMI Counseling: Body mass index is 17 85 kg/m²  The BMI is below normal  Dietary education for weight gain was provided  Rationale for BMI follow-up plan is due to patient being underweight

## 2022-08-02 ENCOUNTER — OFFICE VISIT (OUTPATIENT)
Dept: UROLOGY | Facility: CLINIC | Age: 42
End: 2022-08-02
Payer: COMMERCIAL

## 2022-08-02 VITALS
DIASTOLIC BLOOD PRESSURE: 70 MMHG | WEIGHT: 124 LBS | HEIGHT: 70 IN | SYSTOLIC BLOOD PRESSURE: 124 MMHG | BODY MASS INDEX: 17.75 KG/M2

## 2022-08-02 DIAGNOSIS — L82.1 SEBORRHEIC KERATOSIS: ICD-10-CM

## 2022-08-02 DIAGNOSIS — R39.15 URINARY URGENCY: Primary | ICD-10-CM

## 2022-08-02 DIAGNOSIS — R35.1 NOCTURIA: ICD-10-CM

## 2022-08-02 DIAGNOSIS — A63.0 CONDYLOMA: ICD-10-CM

## 2022-08-02 PROCEDURE — 99214 OFFICE O/P EST MOD 30 MIN: CPT | Performed by: UROLOGY

## 2022-08-02 RX ORDER — OXYBUTYNIN CHLORIDE 5 MG/1
5 TABLET, EXTENDED RELEASE ORAL DAILY PRN
Qty: 30 TABLET | Refills: 11 | Status: SHIPPED | OUTPATIENT
Start: 2022-08-02

## 2022-08-02 NOTE — PATIENT INSTRUCTIONS
Try the imiquimod cream that you have at home  Follow the instructions  See dermatology  Take the oxybutynin in the morning-you can take this as needed

## 2022-08-02 NOTE — PROGRESS NOTES
[address] [facility]  [facility]  [address] [facility] [place], [address] [place] [address]  [phone]  www  [facility]  org      NAME: Stephanie Leon  AGE: 42 y o  SEX: male  : 1980   MRN: [id number]    DATE: 2022  TIME: 9:10 AM    Assessment and Plan:    Recurrent condyloma/seborrheic keratosis:  He can use the Condylox for the lesions that he has as there smaller  Re-examine 6 months  In the meantime I will refer him to Dermatology to see if they have any other options  Urinary urgency and frequency and nocturia:  Tried Ditropan XL 5 mg p o  Q a m  p r n , especially when he is going to work  Hopefully this will help him at work and also keep it from getting up as much at night  The risks of dry mouth, constipation and difficulty urinating were explained  Chief Complaint     Chief Complaint   Patient presents with    Follow-up       History of Present Illness   Recurrent condyloma:  Status post CO2 laser fulguration of condyloma of the penile shaft base and scrotum 2021, had recurrence and underwent excision of multiple condylomatous lesions from the penis and scrotum and suprapubic region, proximally 12 lesions 2022  Here for reassessment  Pathology shows seborrheic keratosis like lesion consistent with low-grade squamous intraepithelial lesion i e  old condyloma acuminatum  Negative for malignancy  For the past couple months he has been having some urgency without urge incontinence but sometimes he feels like he almost might wet himself  Also has been getting up 3 times a night  No excessive water consumption  Cannot relate this to his medications  He is practicing some fluid restriction in the evening      The following portions of the patient's history were reviewed and updated as appropriate: allergies, current medications, past family history, past medical history, past social history, past surgical history and problem list   Past Medical History:   Diagnosis Date    Anxiety     Depression     Wears glasses      Past Surgical History:   Procedure Laterality Date    CHOLECYSTECTOMY      LASER ABLATION OF CONDYLOMAS N/A 8/4/2021    Procedure: EXCISION CONDYLOMA PERINEAL (PENILE) WITH LASER CO2;  Surgeon: Unruly Figueroa MD;  Location: [facility];  Service: Urology    MOLE EXCISION Left     leg    MYRINGOTOMY W/ TUBES  1980    PA LAP,CHOLECYSTECTOMY N/A 5/24/2021    Procedure: CHOLECYSTECTOMY LAPAROSCOPIC;  Surgeon: Harman Hawkins MD;  Location: [facility] MAIN OR;  Service: General     shoulder  Review of Systems   Review of Systems   Genitourinary: Positive for urgency  Negative for difficulty urinating and hematuria  Active Problem List     Patient Active Problem List   Diagnosis    Posttraumatic stress disorder    Bipolar I disorder ([facility] [address] )    Anxiety    Adult antisocial behavior    Adjustment disorder    Medical marijuana use    Pain of left middle finger    Condyloma of male genitalia    Raynaud's disease without gangrene    Bilateral hand pain       Objective   /70   Ht 5' 10" (1 778 m)   Wt 56 2 kg (124 lb)   BMI 17 79 kg/m²     Physical Exam  Vitals reviewed  Constitutional:       Appearance: Normal appearance  HENT:      Head: Normocephalic and atraumatic  Eyes:      Extraocular Movements: Extraocular movements intact  Pulmonary:      Effort: Pulmonary effort is normal    Genitourinary:     Comments: Possible early penile condylomatous lesions/seborrheic keratosis of the ventral portion near the base of the penis and 1 lesion dorsally right base of the penis  Otherwise healing up well  Musculoskeletal:         General: Normal range of motion  Cervical back: Normal range of motion  Neurological:      General: No focal deficit present  Mental Status: He is alert and oriented to person, place, and time     Psychiatric:         Mood and Affect: Mood normal          Behavior: Behavior normal          Thought Content:  Thought content normal          Judgment: Judgment normal              Current Medications     Current Outpatient Medications:     meloxicam (Mobic) 15 mg tablet, Take 1 tablet (15 mg total) by mouth daily, Disp: 30 tablet, Rfl: 5    QUEtiapine (SEROquel) 100 mg tablet, Take 100 mg by mouth daily at bedtime, Disp: , Rfl:         Moriah Castillo MD

## 2022-08-10 NOTE — TELEPHONE ENCOUNTER
Followed by Dr Carlito Juarez  EXCISION CONDYLOMA PERINEAL (PENILE) WITH LASER CO2 (N/A Perineum) on 8/4/2021  Reason for Disposition   Caller has URGENT question and triager unable to answer question    Answer Assessment - Initial Assessment Questions  1  SCROTAL SWELLING: "What does the scrotum look like?" "How swollen is it?" (mild, moderate severe; compare to other side)      Testicles are swollen to the size of a softball (when both testicles are held together)  Penis is swollen and painful  Pain radiates up to abdomen, below umbilicus  2  LOCATION: "Where is the swelling located?"      Scrotum, testicles and penis  3  ONSET: "When did the swelling start?"      Swelling start after his procedure yesterday  It has become worse since then  4  PATTERN: "Does it come and go, or has it been constant since it started?"      Constant  5  SCROTAL PAIN: "Is there any pain?" If so, ask: "How bad is it?"  (Scale 1-10; or mild, moderate, severe)      Constant testicular pain, 7/10  7  OTHER SYMPTOMS: "Do you have any other symptoms?" (e g , fever, abdominal pain, vomiting, difficulty passing urine)      Denied fever or other symptoms      Protocols used: POST-OP SYMPTOMS AND [facility]sch[facility]
Provider Pool: patient has severe swelling and pain in scrotum, testicles and penis  See triage  Please advise 
Regarding: swollen testicles- pain   ----- Message from Loretta Babin sent at 8/5/2021  1:12 PM EDT -----  "I had some dental work done yesterday and now my testicles are swollen up like softballs   There is a lot of pain that goes retirement up my stomach"
Ultrasound ordered   Please assist pt in scheduling appointment
Wear supportive underwear is appropriate while working   HE is able to work just needs to make sure he has enough support until healed
contacted pt at [phone] and spoke to pts mother  pts mother made aware of CRNP advice and comment in previous encounter    pts mother with no other questions at this time  fyi pt is scheduled 8/6/21 at 130pm for ultra sound of scrotum and testes  Please be advised   Thank you
pts care is managed by Dr Castillo Macias  Pt was last seen 8/5/21 for excision of condyloma perineal     contacted pts mother at [phone] and advised of MIGUELITO advice and order for an ultrasound of scrotum and testicles  pts mother was given scheduling number to give to pt to schedule ultra sound of scrotum and testicle  pts mother also reporting several hours after 8/4/21 procedure pt went to work, and was on his feet 3-11pm  Pt went to work today also 3-11pm     pts mother asking if pt should be staying off his feet and if going to work could have caused the testicular swelling ?     Please review and advise thank you
- - -

## 2023-07-10 ENCOUNTER — HOSPITAL ENCOUNTER (OUTPATIENT)
Dept: NON INVASIVE DIAGNOSTICS | Facility: HOSPITAL | Age: 43
Discharge: HOME | End: 2023-07-10
Payer: OTHER GOVERNMENT

## 2023-07-10 VITALS
BODY MASS INDEX: 17.75 KG/M2 | HEART RATE: 87 BPM | DIASTOLIC BLOOD PRESSURE: 70 MMHG | SYSTOLIC BLOOD PRESSURE: 124 MMHG | HEIGHT: 70 IN | WEIGHT: 124 LBS

## 2023-07-10 DIAGNOSIS — R07.9 CHEST PAIN, UNSPECIFIED: ICD-10-CM

## 2023-07-10 LAB
AORTIC ROOT: 2.8 CM
ASCENDING AORTA: 2.8 CM
E WAVE DECELERATION TIME: 231 MS
FRACTIONAL SHORTENING: 30 % (ref 28–44)
INTERVENTRICULAR SEPTUM IN DIASTOLE (PARASTERNAL SHORT AXIS VIEW): 0.7 CM
INTERVENTRICULAR SEPTUM: 0.7 CM (ref 0.6–1.1)
LAAS-AP2: 14.9 CM2
LAAS-AP4: 14 CM2
LEFT ATRIUM SIZE: 2.7 CM
LEFT ATRIUM VOLUME (MOD BIPLANE): 34 ML
LEFT INTERNAL DIMENSION IN SYSTOLE: 3.1 CM (ref 2.1–4)
LEFT VENTRICULAR INTERNAL DIMENSION IN DIASTOLE: 4.4 CM (ref 3.5–6)
LEFT VENTRICULAR POSTERIOR WALL IN END DIASTOLE: 0.8 CM
LEFT VENTRICULAR STROKE VOLUME: 49 ML
LVSV (TEICH): 49 ML
MV E'TISSUE VEL-SEP: 12 CM/S
MV PEAK A VEL: 0.47 M/S
MV PEAK E VEL: 59 CM/S
MV STENOSIS PRESSURE HALF TIME: 67 MS
MV VALVE AREA P 1/2 METHOD: 3.28 CM2
RIGHT ATRIAL 2D VOLUME: 48 ML
RIGHT ATRIUM AREA SYSTOLE A4C: 17.4 CM2
RIGHT VENTRICLE ID DIMENSION: 3.8 CM
SL CV LEFT ATRIUM LENGTH A2C: 5.2 CM
SL CV LV EF: 55
SL CV PED ECHO LEFT VENTRICLE DIASTOLIC VOLUME (MOD BIPLANE) 2D: 86 ML
SL CV PED ECHO LEFT VENTRICLE SYSTOLIC VOLUME (MOD BIPLANE) 2D: 37 ML
TRICUSPID ANNULAR PLANE SYSTOLIC EXCURSION: 1.8 CM

## 2023-07-10 PROCEDURE — 93306 TTE W/DOPPLER COMPLETE: CPT | Performed by: INTERNAL MEDICINE

## 2023-07-10 PROCEDURE — 93306 TTE W/DOPPLER COMPLETE: CPT
